# Patient Record
Sex: MALE | Race: WHITE | NOT HISPANIC OR LATINO | URBAN - METROPOLITAN AREA
[De-identification: names, ages, dates, MRNs, and addresses within clinical notes are randomized per-mention and may not be internally consistent; named-entity substitution may affect disease eponyms.]

---

## 2018-01-16 ENCOUNTER — OUTPATIENT (OUTPATIENT)
Dept: OUTPATIENT SERVICES | Facility: HOSPITAL | Age: 68
LOS: 1 days | End: 2018-01-16
Payer: COMMERCIAL

## 2018-01-16 VITALS
HEIGHT: 69 IN | DIASTOLIC BLOOD PRESSURE: 68 MMHG | WEIGHT: 205.91 LBS | TEMPERATURE: 99 F | OXYGEN SATURATION: 98 % | RESPIRATION RATE: 18 BRPM | SYSTOLIC BLOOD PRESSURE: 122 MMHG | HEART RATE: 73 BPM

## 2018-01-16 DIAGNOSIS — M17.11 UNILATERAL PRIMARY OSTEOARTHRITIS, RIGHT KNEE: ICD-10-CM

## 2018-01-16 DIAGNOSIS — Z98.890 OTHER SPECIFIED POSTPROCEDURAL STATES: Chronic | ICD-10-CM

## 2018-01-16 DIAGNOSIS — Z01.818 ENCOUNTER FOR OTHER PREPROCEDURAL EXAMINATION: ICD-10-CM

## 2018-01-16 DIAGNOSIS — Z96.652 PRESENCE OF LEFT ARTIFICIAL KNEE JOINT: Chronic | ICD-10-CM

## 2018-01-16 LAB
BLD GP AB SCN SERPL QL: SIGNIFICANT CHANGE UP
HBA1C BLD-MCNC: 5.3 % — SIGNIFICANT CHANGE UP (ref 4–5.6)

## 2018-01-16 PROCEDURE — G0463: CPT

## 2018-01-16 PROCEDURE — 83036 HEMOGLOBIN GLYCOSYLATED A1C: CPT

## 2018-01-16 PROCEDURE — 86900 BLOOD TYPING SEROLOGIC ABO: CPT

## 2018-01-16 PROCEDURE — 86901 BLOOD TYPING SEROLOGIC RH(D): CPT

## 2018-01-16 PROCEDURE — 87640 STAPH A DNA AMP PROBE: CPT

## 2018-01-16 PROCEDURE — 87641 MR-STAPH DNA AMP PROBE: CPT

## 2018-01-16 PROCEDURE — 86850 RBC ANTIBODY SCREEN: CPT

## 2018-01-16 NOTE — H&P PST ADULT - PMH
Arthritis    GERD (gastroesophageal reflux disease)    HLD (hyperlipidemia)    PE (pulmonary embolism)  in 1998, off coumadin now Arthritis    GERD (gastroesophageal reflux disease)    HLD (hyperlipidemia)    Osteoarthritis of right knee    PE (pulmonary embolism)  in 1998, off coumadin now Arthritis    GERD (gastroesophageal reflux disease)    Hemorrhoids    HLD (hyperlipidemia)    Osteoarthritis of right knee    PE (pulmonary embolism)  in 1998, off coumadin now  Seasonal allergies

## 2018-01-16 NOTE — H&P PST ADULT - ASSESSMENT
67 yr old male with PMH of Hyperlipidemia, hiatal hernia, OA presents with c/o right knee pain due to severe arthritis. Pt reports worsening of pain with ambulation. Pt for right total knee replacement on 1/24/18. 67 yr old male with PMH of Hyperlipidemia, hiatal hernia, PE in 1998, seasonal allergies, hemorrhoids, OA presents with right knee osteoarthritis.  Pt for right total knee replacement on 1/24/18. Pt is at moderate risk for procedure.

## 2018-01-16 NOTE — H&P PST ADULT - PROBLEM SELECTOR PLAN 1
right total knee replacement on 1/24/18. right total knee replacement on 1/24/18. Hold NSAIDS 1 week before surgery. Take Tylenol prn for pain.

## 2018-01-16 NOTE — H&P PST ADULT - FAMILY HISTORY
Mother  Still living? No  Family history of dementia, Age at diagnosis: Age Unknown     Father  Still living? No  Family history of cerebral hemorrhage, Age at diagnosis: Age Unknown     Grandparent  Still living? No  Family history of heart disease, Age at diagnosis: Age Unknown

## 2018-01-16 NOTE — H&P PST ADULT - NEGATIVE ALLERGY TYPES
no reactions to animals/no reactions to insect bites/no indoor environmental allergies/no reactions to medicines/no reactions to food

## 2018-01-16 NOTE — H&P PST ADULT - ATTENDING COMMENTS
Right knee traumatic arthritis, planned total knee replacement today after failed nonsurgical management for many years.  We discussed again the surgery, goals, recovery, risks, benefits and alternatives and he states he wants to proceed. All of his questions were answered.

## 2018-01-16 NOTE — H&P PST ADULT - NEGATIVE CARDIOVASCULAR SYMPTOMS
no palpitations/no dyspnea on exertion/no paroxysmal nocturnal dyspnea/no orthopnea/no peripheral edema/no claudication/no chest pain

## 2018-01-16 NOTE — H&P PST ADULT - NEGATIVE GASTROINTESTINAL SYMPTOMS
no vomiting/no constipation/no change in bowel habits/no flatulence/no abdominal pain/no diarrhea/no nausea

## 2018-01-16 NOTE — H&P PST ADULT - HISTORY OF PRESENT ILLNESS
67 yr old male with PMH of Hyperlipidemia, hiatal hernia, OA presents with c/o right knee pain due to severe arthritis. Pt reports worsening of pain with ambulation. Pt for right total knee replacement on 67 yr old male with PMH of Hyperlipidemia, hiatal hernia, OA presents with c/o right knee pain due to severe arthritis. Pt reports worsening of pain with ambulation. Pt for right total knee replacement on 1/24/18. 67 yr old male with PMH of Hyperlipidemia, hiatal hernia, PE in 1998, seasonal allergies, hemorrhoids, OA presents with c/o right knee pain due to severe arthritis. Pt reports worsening of pain with ambulation. Pt for right total knee replacement on 1/24/18.

## 2018-01-16 NOTE — H&P PST ADULT - RS GEN PE MLT RESP DETAILS PC
respirations non-labored/clear to auscultation bilaterally/normal/good air movement/breath sounds equal/airway patent no rhonchi/no wheezes/respirations non-labored/clear to auscultation bilaterally/normal/airway patent/no intercostal retractions/no rales/no subcutaneous emphysema/good air movement/breath sounds equal

## 2018-01-16 NOTE — H&P PST ADULT - NSANTHOSAYNRD_GEN_A_CORE
No. SARA screening performed.  STOP BANG Legend: 0-2 = LOW Risk; 3-4 = INTERMEDIATE Risk; 5-8 = HIGH Risk

## 2018-01-16 NOTE — H&P PST ADULT - PSH
History of bilateral carpal tunnel release    S/P knee replacement, left H/O ventral hernia repair    History of bilateral carpal tunnel release    S/P knee replacement, left

## 2018-01-17 LAB
MRSA PCR RESULT.: SIGNIFICANT CHANGE UP
S AUREUS DNA NOSE QL NAA+PROBE: SIGNIFICANT CHANGE UP

## 2018-01-19 ENCOUNTER — TRANSCRIPTION ENCOUNTER (OUTPATIENT)
Age: 68
End: 2018-01-19

## 2018-01-23 DIAGNOSIS — Z98.890 OTHER SPECIFIED POSTPROCEDURAL STATES: Chronic | ICD-10-CM

## 2018-01-24 ENCOUNTER — INPATIENT (INPATIENT)
Facility: HOSPITAL | Age: 68
LOS: 2 days | Discharge: NURSING, REHAB OR LONG-TERM CARE | DRG: 470 | End: 2018-01-27
Attending: ORTHOPAEDIC SURGERY | Admitting: ORTHOPAEDIC SURGERY
Payer: COMMERCIAL

## 2018-01-24 ENCOUNTER — TRANSCRIPTION ENCOUNTER (OUTPATIENT)
Age: 68
End: 2018-01-24

## 2018-01-24 VITALS
RESPIRATION RATE: 18 BRPM | HEART RATE: 61 BPM | SYSTOLIC BLOOD PRESSURE: 116 MMHG | DIASTOLIC BLOOD PRESSURE: 67 MMHG | OXYGEN SATURATION: 95 % | HEIGHT: 69 IN | TEMPERATURE: 98 F | WEIGHT: 205.91 LBS

## 2018-01-24 DIAGNOSIS — Z98.890 OTHER SPECIFIED POSTPROCEDURAL STATES: Chronic | ICD-10-CM

## 2018-01-24 DIAGNOSIS — Z96.652 PRESENCE OF LEFT ARTIFICIAL KNEE JOINT: Chronic | ICD-10-CM

## 2018-01-24 DIAGNOSIS — M17.11 UNILATERAL PRIMARY OSTEOARTHRITIS, RIGHT KNEE: ICD-10-CM

## 2018-01-24 LAB
ANION GAP SERPL CALC-SCNC: 4 MMOL/L — LOW (ref 5–17)
BLD GP AB SCN SERPL QL: SIGNIFICANT CHANGE UP
BUN SERPL-MCNC: 20 MG/DL — HIGH (ref 7–18)
CALCIUM SERPL-MCNC: 8.7 MG/DL — SIGNIFICANT CHANGE UP (ref 8.4–10.5)
CHLORIDE SERPL-SCNC: 107 MMOL/L — SIGNIFICANT CHANGE UP (ref 96–108)
CO2 SERPL-SCNC: 30 MMOL/L — SIGNIFICANT CHANGE UP (ref 22–31)
CREAT SERPL-MCNC: 0.79 MG/DL — SIGNIFICANT CHANGE UP (ref 0.5–1.3)
GLUCOSE SERPL-MCNC: 87 MG/DL — SIGNIFICANT CHANGE UP (ref 70–99)
HCT VFR BLD CALC: 43.8 % — SIGNIFICANT CHANGE UP (ref 39–50)
HGB BLD-MCNC: 13.9 G/DL — SIGNIFICANT CHANGE UP (ref 13–17)
MCHC RBC-ENTMCNC: 31.1 PG — SIGNIFICANT CHANGE UP (ref 27–34)
MCHC RBC-ENTMCNC: 31.8 GM/DL — LOW (ref 32–36)
MCV RBC AUTO: 97.8 FL — SIGNIFICANT CHANGE UP (ref 80–100)
PLATELET # BLD AUTO: 95 K/UL — LOW (ref 150–400)
POTASSIUM SERPL-MCNC: 4.3 MMOL/L — SIGNIFICANT CHANGE UP (ref 3.5–5.3)
POTASSIUM SERPL-SCNC: 4.3 MMOL/L — SIGNIFICANT CHANGE UP (ref 3.5–5.3)
RBC # BLD: 4.48 M/UL — SIGNIFICANT CHANGE UP (ref 4.2–5.8)
RBC # FLD: 14.1 % — SIGNIFICANT CHANGE UP (ref 10.3–14.5)
SODIUM SERPL-SCNC: 141 MMOL/L — SIGNIFICANT CHANGE UP (ref 135–145)
WBC # BLD: 3.8 K/UL — SIGNIFICANT CHANGE UP (ref 3.8–10.5)
WBC # FLD AUTO: 3.8 K/UL — SIGNIFICANT CHANGE UP (ref 3.8–10.5)

## 2018-01-24 PROCEDURE — 73562 X-RAY EXAM OF KNEE 3: CPT | Mod: 26,RT

## 2018-01-24 RX ORDER — CELECOXIB 200 MG/1
200 CAPSULE ORAL DAILY
Qty: 0 | Refills: 0 | Status: DISCONTINUED | OUTPATIENT
Start: 2018-01-25 | End: 2018-01-25

## 2018-01-24 RX ORDER — PHENYLEPHRINE HCL 0.25 %
1 SUPPOSITORY, RECTAL RECTAL
Qty: 0 | Refills: 0 | COMMUNITY

## 2018-01-24 RX ORDER — CELECOXIB 200 MG/1
200 CAPSULE ORAL ONCE
Qty: 0 | Refills: 0 | Status: COMPLETED | OUTPATIENT
Start: 2018-01-24 | End: 2018-01-24

## 2018-01-24 RX ORDER — TRANEXAMIC ACID 100 MG/ML
INJECTION, SOLUTION INTRAVENOUS
Qty: 0 | Refills: 0 | Status: DISCONTINUED | OUTPATIENT
Start: 2018-01-24 | End: 2018-01-24

## 2018-01-24 RX ORDER — ONDANSETRON 8 MG/1
4 TABLET, FILM COATED ORAL EVERY 6 HOURS
Qty: 0 | Refills: 0 | Status: DISCONTINUED | OUTPATIENT
Start: 2018-01-24 | End: 2018-01-27

## 2018-01-24 RX ORDER — FLUTICASONE PROPIONATE 50 MCG
1 SPRAY, SUSPENSION NASAL DAILY
Qty: 0 | Refills: 0 | Status: DISCONTINUED | OUTPATIENT
Start: 2018-01-24 | End: 2018-01-27

## 2018-01-24 RX ORDER — TRAMADOL HYDROCHLORIDE 50 MG/1
50 TABLET ORAL EVERY 8 HOURS
Qty: 0 | Refills: 0 | Status: DISCONTINUED | OUTPATIENT
Start: 2018-01-24 | End: 2018-01-27

## 2018-01-24 RX ORDER — SODIUM CHLORIDE 9 MG/ML
1000 INJECTION, SOLUTION INTRAVENOUS
Qty: 0 | Refills: 0 | Status: DISCONTINUED | OUTPATIENT
Start: 2018-01-24 | End: 2018-01-27

## 2018-01-24 RX ORDER — OXYCODONE HYDROCHLORIDE 5 MG/1
5 TABLET ORAL EVERY 4 HOURS
Qty: 0 | Refills: 0 | Status: DISCONTINUED | OUTPATIENT
Start: 2018-01-24 | End: 2018-01-27

## 2018-01-24 RX ORDER — HYDROMORPHONE HYDROCHLORIDE 2 MG/ML
0.5 INJECTION INTRAMUSCULAR; INTRAVENOUS; SUBCUTANEOUS EVERY 4 HOURS
Qty: 0 | Refills: 0 | Status: DISCONTINUED | OUTPATIENT
Start: 2018-01-24 | End: 2018-01-27

## 2018-01-24 RX ORDER — BUPIVACAINE 13.3 MG/ML
20 INJECTION, SUSPENSION, LIPOSOMAL INFILTRATION ONCE
Qty: 0 | Refills: 0 | Status: DISCONTINUED | OUTPATIENT
Start: 2018-01-24 | End: 2018-01-24

## 2018-01-24 RX ORDER — ASCORBIC ACID 60 MG
500 TABLET,CHEWABLE ORAL
Qty: 0 | Refills: 0 | Status: DISCONTINUED | OUTPATIENT
Start: 2018-01-24 | End: 2018-01-27

## 2018-01-24 RX ORDER — SENNA PLUS 8.6 MG/1
2 TABLET ORAL AT BEDTIME
Qty: 0 | Refills: 0 | Status: DISCONTINUED | OUTPATIENT
Start: 2018-01-24 | End: 2018-01-27

## 2018-01-24 RX ORDER — ENOXAPARIN SODIUM 100 MG/ML
30 INJECTION SUBCUTANEOUS EVERY 12 HOURS
Qty: 0 | Refills: 0 | Status: DISCONTINUED | OUTPATIENT
Start: 2018-01-25 | End: 2018-01-27

## 2018-01-24 RX ORDER — ACETAMINOPHEN 500 MG
650 TABLET ORAL EVERY 6 HOURS
Qty: 0 | Refills: 0 | Status: DISCONTINUED | OUTPATIENT
Start: 2018-01-24 | End: 2018-01-27

## 2018-01-24 RX ORDER — SIMVASTATIN 20 MG/1
20 TABLET, FILM COATED ORAL AT BEDTIME
Qty: 0 | Refills: 0 | Status: DISCONTINUED | OUTPATIENT
Start: 2018-01-24 | End: 2018-01-27

## 2018-01-24 RX ORDER — DOCUSATE SODIUM 100 MG
100 CAPSULE ORAL THREE TIMES A DAY
Qty: 0 | Refills: 0 | Status: DISCONTINUED | OUTPATIENT
Start: 2018-01-24 | End: 2018-01-27

## 2018-01-24 RX ORDER — SODIUM CHLORIDE 9 MG/ML
3 INJECTION INTRAMUSCULAR; INTRAVENOUS; SUBCUTANEOUS EVERY 8 HOURS
Qty: 0 | Refills: 0 | Status: DISCONTINUED | OUTPATIENT
Start: 2018-01-24 | End: 2018-01-27

## 2018-01-24 RX ORDER — CEFAZOLIN SODIUM 1 G
1000 VIAL (EA) INJECTION EVERY 8 HOURS
Qty: 0 | Refills: 0 | Status: COMPLETED | OUTPATIENT
Start: 2018-01-24 | End: 2018-01-25

## 2018-01-24 RX ORDER — FOLIC ACID 0.8 MG
1 TABLET ORAL DAILY
Qty: 0 | Refills: 0 | Status: DISCONTINUED | OUTPATIENT
Start: 2018-01-24 | End: 2018-01-27

## 2018-01-24 RX ORDER — GABAPENTIN 400 MG/1
100 CAPSULE ORAL ONCE
Qty: 0 | Refills: 0 | Status: COMPLETED | OUTPATIENT
Start: 2018-01-24 | End: 2018-01-24

## 2018-01-24 RX ORDER — LORATADINE 10 MG/1
10 TABLET ORAL DAILY
Qty: 0 | Refills: 0 | Status: DISCONTINUED | OUTPATIENT
Start: 2018-01-24 | End: 2018-01-27

## 2018-01-24 RX ORDER — PANTOPRAZOLE SODIUM 20 MG/1
40 TABLET, DELAYED RELEASE ORAL
Qty: 0 | Refills: 0 | Status: DISCONTINUED | OUTPATIENT
Start: 2018-01-24 | End: 2018-01-25

## 2018-01-24 RX ORDER — HYDROMORPHONE HYDROCHLORIDE 2 MG/ML
0.5 INJECTION INTRAMUSCULAR; INTRAVENOUS; SUBCUTANEOUS
Qty: 0 | Refills: 0 | Status: DISCONTINUED | OUTPATIENT
Start: 2018-01-24 | End: 2018-01-24

## 2018-01-24 RX ORDER — TRANEXAMIC ACID 100 MG/ML
1000 INJECTION, SOLUTION INTRAVENOUS ONCE
Qty: 0 | Refills: 0 | Status: DISCONTINUED | OUTPATIENT
Start: 2018-01-24 | End: 2018-01-24

## 2018-01-24 RX ORDER — FAMOTIDINE 10 MG/ML
20 INJECTION INTRAVENOUS EVERY 12 HOURS
Qty: 0 | Refills: 0 | Status: DISCONTINUED | OUTPATIENT
Start: 2018-01-24 | End: 2018-01-27

## 2018-01-24 RX ORDER — GABAPENTIN 400 MG/1
100 CAPSULE ORAL THREE TIMES A DAY
Qty: 0 | Refills: 0 | Status: DISCONTINUED | OUTPATIENT
Start: 2018-01-24 | End: 2018-01-27

## 2018-01-24 RX ORDER — FERROUS SULFATE 325(65) MG
325 TABLET ORAL
Qty: 0 | Refills: 0 | Status: DISCONTINUED | OUTPATIENT
Start: 2018-01-24 | End: 2018-01-27

## 2018-01-24 RX ORDER — TRAMADOL HYDROCHLORIDE 50 MG/1
25 TABLET ORAL ONCE
Qty: 0 | Refills: 0 | Status: DISCONTINUED | OUTPATIENT
Start: 2018-01-24 | End: 2018-01-24

## 2018-01-24 RX ADMIN — HYDROMORPHONE HYDROCHLORIDE 0.5 MILLIGRAM(S): 2 INJECTION INTRAMUSCULAR; INTRAVENOUS; SUBCUTANEOUS at 21:09

## 2018-01-24 RX ADMIN — GABAPENTIN 100 MILLIGRAM(S): 400 CAPSULE ORAL at 10:13

## 2018-01-24 RX ADMIN — HYDROMORPHONE HYDROCHLORIDE 0.5 MILLIGRAM(S): 2 INJECTION INTRAMUSCULAR; INTRAVENOUS; SUBCUTANEOUS at 19:25

## 2018-01-24 RX ADMIN — Medication 100 MILLIGRAM(S): at 21:53

## 2018-01-24 RX ADMIN — CELECOXIB 200 MILLIGRAM(S): 200 CAPSULE ORAL at 10:14

## 2018-01-24 RX ADMIN — TRAMADOL HYDROCHLORIDE 50 MILLIGRAM(S): 50 TABLET ORAL at 22:03

## 2018-01-24 RX ADMIN — HYDROMORPHONE HYDROCHLORIDE 0.5 MILLIGRAM(S): 2 INJECTION INTRAMUSCULAR; INTRAVENOUS; SUBCUTANEOUS at 21:40

## 2018-01-24 RX ADMIN — SENNA PLUS 2 TABLET(S): 8.6 TABLET ORAL at 21:56

## 2018-01-24 RX ADMIN — SIMVASTATIN 20 MILLIGRAM(S): 20 TABLET, FILM COATED ORAL at 21:56

## 2018-01-24 RX ADMIN — Medication 100 MILLIGRAM(S): at 21:56

## 2018-01-24 RX ADMIN — Medication 500 MILLIGRAM(S): at 21:55

## 2018-01-24 RX ADMIN — GABAPENTIN 100 MILLIGRAM(S): 400 CAPSULE ORAL at 21:55

## 2018-01-24 RX ADMIN — SODIUM CHLORIDE 3 MILLILITER(S): 9 INJECTION INTRAMUSCULAR; INTRAVENOUS; SUBCUTANEOUS at 10:15

## 2018-01-24 RX ADMIN — HYDROMORPHONE HYDROCHLORIDE 0.5 MILLIGRAM(S): 2 INJECTION INTRAMUSCULAR; INTRAVENOUS; SUBCUTANEOUS at 18:55

## 2018-01-24 RX ADMIN — TRAMADOL HYDROCHLORIDE 25 MILLIGRAM(S): 50 TABLET ORAL at 10:13

## 2018-01-24 NOTE — PATIENT PROFILE ADULT. - PMH
Arthritis    GERD (gastroesophageal reflux disease)    Hemorrhoids    HLD (hyperlipidemia)    Osteoarthritis of right knee    PE (pulmonary embolism)  in 1998, off coumadin now  Seasonal allergies

## 2018-01-24 NOTE — PATIENT PROFILE ADULT. - PSH
H/O ventral hernia repair    History of bilateral carpal tunnel release    S/P knee replacement, left

## 2018-01-25 ENCOUNTER — TRANSCRIPTION ENCOUNTER (OUTPATIENT)
Age: 68
End: 2018-01-25

## 2018-01-25 DIAGNOSIS — M25.561 PAIN IN RIGHT KNEE: ICD-10-CM

## 2018-01-25 DIAGNOSIS — Z96.651 PRESENCE OF RIGHT ARTIFICIAL KNEE JOINT: ICD-10-CM

## 2018-01-25 DIAGNOSIS — R06.2 WHEEZING: ICD-10-CM

## 2018-01-25 LAB
ANION GAP SERPL CALC-SCNC: 3 MMOL/L — LOW (ref 5–17)
BUN SERPL-MCNC: 17 MG/DL — SIGNIFICANT CHANGE UP (ref 7–18)
CALCIUM SERPL-MCNC: 8.5 MG/DL — SIGNIFICANT CHANGE UP (ref 8.4–10.5)
CHLORIDE SERPL-SCNC: 103 MMOL/L — SIGNIFICANT CHANGE UP (ref 96–108)
CO2 SERPL-SCNC: 31 MMOL/L — SIGNIFICANT CHANGE UP (ref 22–31)
CREAT SERPL-MCNC: 0.7 MG/DL — SIGNIFICANT CHANGE UP (ref 0.5–1.3)
GLUCOSE SERPL-MCNC: 89 MG/DL — SIGNIFICANT CHANGE UP (ref 70–99)
HCT VFR BLD CALC: 39.8 % — SIGNIFICANT CHANGE UP (ref 39–50)
HGB BLD-MCNC: 12.8 G/DL — LOW (ref 13–17)
MCHC RBC-ENTMCNC: 31.4 PG — SIGNIFICANT CHANGE UP (ref 27–34)
MCHC RBC-ENTMCNC: 32.1 GM/DL — SIGNIFICANT CHANGE UP (ref 32–36)
MCV RBC AUTO: 97.8 FL — SIGNIFICANT CHANGE UP (ref 80–100)
PLATELET # BLD AUTO: 89 K/UL — LOW (ref 150–400)
POTASSIUM SERPL-MCNC: 3.9 MMOL/L — SIGNIFICANT CHANGE UP (ref 3.5–5.3)
POTASSIUM SERPL-SCNC: 3.9 MMOL/L — SIGNIFICANT CHANGE UP (ref 3.5–5.3)
RBC # BLD: 4.08 M/UL — LOW (ref 4.2–5.8)
RBC # FLD: 14 % — SIGNIFICANT CHANGE UP (ref 10.3–14.5)
SODIUM SERPL-SCNC: 137 MMOL/L — SIGNIFICANT CHANGE UP (ref 135–145)
WBC # BLD: 5.5 K/UL — SIGNIFICANT CHANGE UP (ref 3.8–10.5)
WBC # FLD AUTO: 5.5 K/UL — SIGNIFICANT CHANGE UP (ref 3.8–10.5)

## 2018-01-25 RX ORDER — ENOXAPARIN SODIUM 100 MG/ML
40 INJECTION SUBCUTANEOUS
Qty: 12 | Refills: 0
Start: 2018-01-25 | End: 2018-02-05

## 2018-01-25 RX ORDER — GABAPENTIN 400 MG/1
3 CAPSULE ORAL
Qty: 270 | Refills: 0 | OUTPATIENT
Start: 2018-01-25 | End: 2018-02-23

## 2018-01-25 RX ORDER — DOCUSATE SODIUM 100 MG
1 CAPSULE ORAL
Qty: 30 | Refills: 0
Start: 2018-01-25

## 2018-01-25 RX ORDER — ACETAMINOPHEN 500 MG
2 TABLET ORAL
Qty: 0 | Refills: 0 | COMMUNITY

## 2018-01-25 RX ORDER — BENZOCAINE AND MENTHOL 5; 1 G/100ML; G/100ML
1 LIQUID ORAL ONCE
Qty: 0 | Refills: 0 | Status: COMPLETED | OUTPATIENT
Start: 2018-01-25 | End: 2018-01-25

## 2018-01-25 RX ORDER — FOLIC ACID 0.8 MG
1 TABLET ORAL
Qty: 30 | Refills: 0
Start: 2018-01-25

## 2018-01-25 RX ORDER — TRAMADOL HYDROCHLORIDE 50 MG/1
1 TABLET ORAL
Qty: 30 | Refills: 0
Start: 2018-01-25

## 2018-01-25 RX ORDER — ZOLPIDEM TARTRATE 10 MG/1
5 TABLET ORAL AT BEDTIME
Qty: 0 | Refills: 0 | Status: DISCONTINUED | OUTPATIENT
Start: 2018-01-25 | End: 2018-01-25

## 2018-01-25 RX ORDER — ASCORBIC ACID 60 MG
1 TABLET,CHEWABLE ORAL
Qty: 30 | Refills: 0
Start: 2018-01-25

## 2018-01-25 RX ORDER — CELECOXIB 200 MG/1
1 CAPSULE ORAL
Qty: 30 | Refills: 0
Start: 2018-01-25 | End: 2018-02-23

## 2018-01-25 RX ORDER — BENZOCAINE AND MENTHOL 5; 1 G/100ML; G/100ML
1 LIQUID ORAL EVERY 4 HOURS
Qty: 0 | Refills: 0 | Status: DISCONTINUED | OUTPATIENT
Start: 2018-01-25 | End: 2018-01-27

## 2018-01-25 RX ADMIN — GABAPENTIN 100 MILLIGRAM(S): 400 CAPSULE ORAL at 15:16

## 2018-01-25 RX ADMIN — GABAPENTIN 100 MILLIGRAM(S): 400 CAPSULE ORAL at 05:18

## 2018-01-25 RX ADMIN — OXYCODONE HYDROCHLORIDE 5 MILLIGRAM(S): 5 TABLET ORAL at 10:05

## 2018-01-25 RX ADMIN — Medication 500 MILLIGRAM(S): at 05:18

## 2018-01-25 RX ADMIN — BENZOCAINE AND MENTHOL 1 LOZENGE: 5; 1 LIQUID ORAL at 06:11

## 2018-01-25 RX ADMIN — ZOLPIDEM TARTRATE 5 MILLIGRAM(S): 10 TABLET ORAL at 21:29

## 2018-01-25 RX ADMIN — Medication 325 MILLIGRAM(S): at 18:26

## 2018-01-25 RX ADMIN — GABAPENTIN 100 MILLIGRAM(S): 400 CAPSULE ORAL at 21:29

## 2018-01-25 RX ADMIN — OXYCODONE HYDROCHLORIDE 5 MILLIGRAM(S): 5 TABLET ORAL at 06:30

## 2018-01-25 RX ADMIN — SENNA PLUS 2 TABLET(S): 8.6 TABLET ORAL at 21:29

## 2018-01-25 RX ADMIN — TRAMADOL HYDROCHLORIDE 50 MILLIGRAM(S): 50 TABLET ORAL at 22:30

## 2018-01-25 RX ADMIN — SODIUM CHLORIDE 3 MILLILITER(S): 9 INJECTION INTRAMUSCULAR; INTRAVENOUS; SUBCUTANEOUS at 21:33

## 2018-01-25 RX ADMIN — TRAMADOL HYDROCHLORIDE 50 MILLIGRAM(S): 50 TABLET ORAL at 05:19

## 2018-01-25 RX ADMIN — SIMVASTATIN 20 MILLIGRAM(S): 20 TABLET, FILM COATED ORAL at 21:29

## 2018-01-25 RX ADMIN — HYDROMORPHONE HYDROCHLORIDE 0.5 MILLIGRAM(S): 2 INJECTION INTRAMUSCULAR; INTRAVENOUS; SUBCUTANEOUS at 03:25

## 2018-01-25 RX ADMIN — ENOXAPARIN SODIUM 30 MILLIGRAM(S): 100 INJECTION SUBCUTANEOUS at 05:20

## 2018-01-25 RX ADMIN — FAMOTIDINE 20 MILLIGRAM(S): 10 INJECTION INTRAVENOUS at 18:26

## 2018-01-25 RX ADMIN — Medication 500 MILLIGRAM(S): at 18:26

## 2018-01-25 RX ADMIN — FAMOTIDINE 20 MILLIGRAM(S): 10 INJECTION INTRAVENOUS at 05:18

## 2018-01-25 RX ADMIN — SODIUM CHLORIDE 3 MILLILITER(S): 9 INJECTION INTRAMUSCULAR; INTRAVENOUS; SUBCUTANEOUS at 05:28

## 2018-01-25 RX ADMIN — BENZOCAINE AND MENTHOL 1 LOZENGE: 5; 1 LIQUID ORAL at 21:29

## 2018-01-25 RX ADMIN — Medication 325 MILLIGRAM(S): at 13:01

## 2018-01-25 RX ADMIN — TRAMADOL HYDROCHLORIDE 50 MILLIGRAM(S): 50 TABLET ORAL at 21:29

## 2018-01-25 RX ADMIN — Medication 100 MILLIGRAM(S): at 05:18

## 2018-01-25 RX ADMIN — OXYCODONE HYDROCHLORIDE 5 MILLIGRAM(S): 5 TABLET ORAL at 10:35

## 2018-01-25 RX ADMIN — SODIUM CHLORIDE 3 MILLILITER(S): 9 INJECTION INTRAMUSCULAR; INTRAVENOUS; SUBCUTANEOUS at 03:24

## 2018-01-25 RX ADMIN — TRAMADOL HYDROCHLORIDE 50 MILLIGRAM(S): 50 TABLET ORAL at 13:30

## 2018-01-25 RX ADMIN — Medication 1 TABLET(S): at 13:01

## 2018-01-25 RX ADMIN — TRAMADOL HYDROCHLORIDE 50 MILLIGRAM(S): 50 TABLET ORAL at 05:20

## 2018-01-25 RX ADMIN — Medication 100 MILLIGRAM(S): at 13:01

## 2018-01-25 RX ADMIN — OXYCODONE HYDROCHLORIDE 5 MILLIGRAM(S): 5 TABLET ORAL at 06:03

## 2018-01-25 RX ADMIN — SODIUM CHLORIDE 3 MILLILITER(S): 9 INJECTION INTRAMUSCULAR; INTRAVENOUS; SUBCUTANEOUS at 14:55

## 2018-01-25 RX ADMIN — Medication 1 MILLIGRAM(S): at 13:01

## 2018-01-25 RX ADMIN — ENOXAPARIN SODIUM 30 MILLIGRAM(S): 100 INJECTION SUBCUTANEOUS at 18:26

## 2018-01-25 RX ADMIN — Medication 100 MILLIGRAM(S): at 21:29

## 2018-01-25 RX ADMIN — TRAMADOL HYDROCHLORIDE 50 MILLIGRAM(S): 50 TABLET ORAL at 13:03

## 2018-01-25 RX ADMIN — HYDROMORPHONE HYDROCHLORIDE 0.5 MILLIGRAM(S): 2 INJECTION INTRAMUSCULAR; INTRAVENOUS; SUBCUTANEOUS at 02:54

## 2018-01-25 NOTE — PHYSICAL THERAPY INITIAL EVALUATION ADULT - CRITERIA FOR SKILLED THERAPEUTIC INTERVENTIONS
rehab potential/anticipated equipment needs at discharge/predicted duration of therapy intervention/impairments found/therapy frequency/anticipated discharge recommendation

## 2018-01-25 NOTE — DISCHARGE NOTE ADULT - CARE PLAN
Principal Discharge DX:	Knee joint replacement status, right  Goal:	Increase mobility decrease pain  Assessment and plan of treatment:	wound assessment

## 2018-01-25 NOTE — PHYSICAL THERAPY INITIAL EVALUATION ADULT - ACTIVE RANGE OF MOTION EXAMINATION, REHAB EVAL
right ankle-WFL/Left LE Active ROM was WFL (within functional limits)/bilateral upper extremity Active ROM was WFL (within functional limits)

## 2018-01-25 NOTE — CONSULT NOTE ADULT - SUBJECTIVE AND OBJECTIVE BOX
HPI:  67 yr old male with PMH of Hyperlipidemia, hiatal hernia, PE in 1998, seasonal allergies, hemorrhoids, OA presents with c/o right knee pain due to severe arthritis. Pt reports worsening of pain with ambulation. Pt for right total knee replacement on 1/24/18. (16 Jan 2018 11:43)      1/25/18 - 67 year old male, s/p right knee replacement.  Pt complaining of right knee pain which worsens on exertion.  No nausea or vomiting.  No chest pain or sob.  Pt oob with PT.      PAIN SCORE:     5/10    SCALE USED: (1-10 VNRS)      PAST MEDICAL & SURGICAL HISTORY:  Hemorrhoids  Seasonal allergies  Osteoarthritis of right knee  PE (pulmonary embolism): in 1998, off coumadin now  Arthritis  GERD (gastroesophageal reflux disease)  HLD (hyperlipidemia)  H/O ventral hernia repair  History of bilateral carpal tunnel release  S/P knee replacement, left      FAMILY HISTORY:  Family history of heart disease (Grandparent)  Family history of cerebral hemorrhage (Father)  Family history of dementia (Mother)      SOCIAL HISTORY:  [x ] Denies Smoking, Alcohol, or Drug Use    Allergies    No Known Allergies    Intolerances        PAIN MEDICATIONS:  acetaminophen   Tablet 650 milliGRAM(s) Oral every 6 hours PRN  gabapentin 100 milliGRAM(s) Oral three times a day  HYDROmorphone  Injectable 0.5 milliGRAM(s) IV Push every 4 hours PRN  ondansetron Injectable 4 milliGRAM(s) IV Push every 6 hours PRN  oxyCODONE    IR 5 milliGRAM(s) Oral every 4 hours PRN  traMADol 50 milliGRAM(s) Oral every 8 hours  zolpidem 5 milliGRAM(s) Oral at bedtime PRN    Heme:  enoxaparin Injectable 30 milliGRAM(s) SubCutaneous every 12 hours    Antibiotics:    Cardiovascular:    GI:  aluminum hydroxide/magnesium hydroxide/simethicone Suspension 30 milliLiter(s) Oral four times a day PRN  docusate sodium 100 milliGRAM(s) Oral three times a day  famotidine    Tablet 20 milliGRAM(s) Oral every 12 hours  senna 2 Tablet(s) Oral at bedtime    Endocrine:  simvastatin 20 milliGRAM(s) Oral at bedtime    All Other Medications:  ascorbic acid 500 milliGRAM(s) Oral two times a day  dextrose 5% + sodium chloride 0.9%. 1000 milliLiter(s) IV Continuous <Continuous>  ferrous    sulfate 325 milliGRAM(s) Oral three times a day with meals  fluticasone propionate 50 MICROgram(s)/spray Nasal Spray 1 Spray(s) Both Nostrils daily PRN  folic acid 1 milliGRAM(s) Oral daily  multivitamin 1 Tablet(s) Oral daily  sodium chloride 0.9% lock flush 3 milliLiter(s) IV Push every 8 hours          Vital Signs Last 24 Hrs  T(C): 36.5 (25 Jan 2018 05:50), Max: 36.9 (24 Jan 2018 18:15)  T(F): 97.7 (25 Jan 2018 05:50), Max: 98.4 (24 Jan 2018 18:15)  HR: 70 (25 Jan 2018 05:50) (39 - 104)  BP: 100/55 (25 Jan 2018 05:50) (100/55 - 193/100)  BP(mean): 83 (24 Jan 2018 20:21) (75 - 125)  RR: 16 (25 Jan 2018 05:50) (11 - 21)  SpO2: 97% (25 Jan 2018 05:50) (95% - 100%)                       LABS:                          12.8   5.5   )-----------( 89       ( 25 Jan 2018 07:00 )             39.8     01-25    137  |  103  |  17  ----------------------------<  89  3.9   |  31  |  0.70    Ca    8.5      25 Jan 2018 07:00            RADIOLOGY:    Drug Screen:            [ ]  [place]  Reviewed and Copied to Chart

## 2018-01-25 NOTE — DISCHARGE NOTE ADULT - MEDICATION SUMMARY - MEDICATIONS TO STOP TAKING
I will STOP taking the medications listed below when I get home from the hospital:    Aleve sodium 220 mg oral tablet  -- 2 tab(s) by mouth once a day, As Needed    Mucinex 600 mg oral tablet, extended release  -- 1 tab(s) by mouth every 12 hours, As Needed    Tylenol 8 HR Arthritis Pain 650 mg oral tablet, extended release  -- 2 tab(s) by mouth every 8 hours, As Needed

## 2018-01-25 NOTE — PROGRESS NOTE ADULT - ASSESSMENT
Right TKA POD 1  doing well    1.  start PT, WBAT.    2.  DVT PPX with Lovenox  3.  ADAT  4. Pain control  5.  DC planning for tomorrow to home with services.   FU office 3 weeks [phone]

## 2018-01-25 NOTE — PROGRESS NOTE ADULT - SUBJECTIVE AND OBJECTIVE BOX
Attending note:   Patient seen and examined on rounds.  POD #1 right TKA  Denies chest pain or SOB.  Pain is controlled.      EXAM;  Right knee dressings CDI  Calf pumps in place  EHL TA GS 5/5  SILT  Calves supple bilaterally  Abdomen soft and NT

## 2018-01-25 NOTE — DISCHARGE NOTE ADULT - CARE PROVIDER_API CALL
Lajam, Claudette M (MD), Orthopaedic Surgery  [address] [place] [address] [address] [place] [id number]  Phone: [phone]  Fax: [phone]

## 2018-01-25 NOTE — CONSULT NOTE ADULT - PROBLEM SELECTOR RECOMMENDATION 9
pain control  ortho follow up  incentive spirometry  DVT PPX  PT/Rehab
- platelets <100  - dc celebrex  - continue tramadol 50mg po q 8  - oxycodone 5mg po prn  - will dc hydromorphone today  - stool softeners  - oob

## 2018-01-25 NOTE — DISCHARGE NOTE ADULT - ADDITIONAL INSTRUCTIONS
Pain management  DVT PPx  WBAT with walker  D/c Lovenox on 2/8/18  D/c Staples on 2/8/18  F/u with Dr Amaro

## 2018-01-25 NOTE — DISCHARGE NOTE ADULT - MEDICATION SUMMARY - MEDICATIONS TO TAKE
I will START or STAY ON the medications listed below when I get home from the hospital:    spirulina- SEAWEED  -- 1 cap(s) by mouth once a day  -- Indication: For Knee joint replacement status, right    tumeric  -- 1 cap(s) by mouth once a day  -- Indication: For Knee joint replacement status, right    ginseng complex  -- 1 cap(s) by mouth once a day  -- Indication: For Knee joint replacement status, right    ginsenna  -- 1 cap(s) by mouth once a day  -- Indication: For Knee joint replacement status, right    traMADol 50 mg oral tablet  -- 1 tab(s) by mouth every 6 hours, As Needed MDD:4  -- Indication: For Knee joint replacement status, right    CeleBREX 200 mg oral capsule  -- 1 cap(s) by mouth once a day   -- Do not take this drug if you are pregnant.  Medication should be taken with plenty of water.  Obtain medical advice before taking any non-prescription drugs as some may affect the action of this medication.  Take with food or milk.    -- Indication: For Knee joint replacement status, right    Lovenox 40 mg/0.4 mL injectable solution  -- 40 milligram(s) subcutaneously once a day   -- It is very important that you take or use this exactly as directed.  Do not skip doses or discontinue unless directed by your doctor.    -- Indication: For Knee joint replacement status, right    gabapentin 100 mg oral capsule  -- 3 cap(s) by mouth 3 times a day   -- It is very important that you take or use this exactly as directed.  Do not skip doses or discontinue unless directed by your doctor.  May cause drowsiness.  Alcohol may intensify this effect.  Use care when operating dangerous machinery.    -- Indication: For Knee joint replacement status, right    Claritin 10 mg oral tablet  -- 1 tab(s) by mouth once a day, As Needed  -- Indication: For Knee joint replacement status, right    simvastatin 20 mg oral tablet  -- 1 tab(s) by mouth once a day (at bedtime)  -- Indication: For Knee joint replacement status, right    Saw Palmetto oral capsule  -- 1 cap(s) by mouth once a day  -- Indication: For Knee joint replacement status, right    Ginger Root oral capsule  -- 1 cap(s) by mouth once a day  -- Indication: For Knee joint replacement status, right    Cinnamon 500 mg oral capsule  -- 2 cap(s) by mouth 2 times a day  -- Indication: For Knee joint replacement status, right    docusate sodium 100 mg oral capsule  -- 1 cap(s) by mouth 3 times a day  -- Indication: For Knee joint replacement status, right    selenium 50 mcg oral tablet  -- 1 tab(s) by mouth once a day  -- Indication: For Knee joint replacement status, right    fluticasone 50 mcg/inh nasal spray  -- 1 spray(s) into nose once a day, As Needed  -- Indication: For Knee joint replacement status, right    CoQ10 300 mg oral capsule  -- 1 cap(s) by mouth once a day  -- Indication: For Knee joint replacement status, right    Probiotic Formula oral capsule  -- 1 cap(s) by mouth once a day  -- Indication: For Knee joint replacement status, right    Dexilant 60 mg oral delayed release capsule  -- 1 cap(s) by mouth once a day  -- Indication: For Knee joint replacement status, right    testosterone  --  intramuscular every 4 weeks  -- Indication: For Knee joint replacement status, right    Multiple Vitamins oral tablet  -- 1 tab(s) by mouth once a day  -- Indication: For Knee joint replacement status, right    Vitamin B-100 oral tablet  -- 1 tab(s) by mouth once a day  -- Indication: For Knee joint replacement status, right    Vitamin B6 100 mg oral tablet  -- 1 tab(s) by mouth once a day  -- Indication: For Knee joint replacement status, right    Vitamin B12 100 mcg oral tablet  -- 1 tab(s) by mouth once a day  -- Indication: For Knee joint replacement status, right    Vitamin D3 1000 intl units oral tablet  -- 1 tab(s) by mouth once a day  -- Indication: For Knee joint replacement status, right    ascorbic acid 500 mg oral tablet  -- 1 tab(s) by mouth 2 times a day  -- Indication: For Knee joint replacement status, right    folic acid 1 mg oral tablet  -- 1 tab(s) by mouth once a day  -- Indication: For Knee joint replacement status, right I will START or STAY ON the medications listed below when I get home from the hospital:    spirulina- SEAWEED  -- 1 cap(s) by mouth once a day  -- Indication: For Knee joint replacement status, right    tumeric  -- 1 cap(s) by mouth once a day  -- Indication: For Knee joint replacement status, right    ginseng complex  -- 1 cap(s) by mouth once a day  -- Indication: For Knee joint replacement status, right    ginsenna  -- 1 cap(s) by mouth once a day  -- Indication: For Knee joint replacement status, right    traMADol 50 mg oral tablet  -- 1 tab(s) by mouth every 6 hours, As Needed MDD:4  -- Indication: For Knee joint replacement status, right    CeleBREX 200 mg oral capsule  -- 1 cap(s) by mouth once a day   -- Do not take this drug if you are pregnant.  Medication should be taken with plenty of water.  Obtain medical advice before taking any non-prescription drugs as some may affect the action of this medication.  Take with food or milk.    -- Indication: For Knee joint replacement status, right    Lovenox 40 mg/0.4 mL injectable solution  -- 40 milligram(s) subcutaneously once a day   -- It is very important that you take or use this exactly as directed.  Do not skip doses or discontinue unless directed by your doctor.    -- Indication: For Knee joint replacement status, right    gabapentin 100 mg oral capsule  -- 1 cap(s) by mouth once a day MDD:1  -- It is very important that you take or use this exactly as directed.  Do not skip doses or discontinue unless directed by your doctor.  May cause drowsiness.  Alcohol may intensify this effect.  Use care when operating dangerous machinery.    -- Indication: For Pain    Claritin 10 mg oral tablet  -- 1 tab(s) by mouth once a day, As Needed  -- Indication: For Knee joint replacement status, right    simvastatin 20 mg oral tablet  -- 1 tab(s) by mouth once a day (at bedtime)  -- Indication: For Knee joint replacement status, right    doxycycline hyclate 100 mg oral capsule  -- 1 cap(s) by mouth once a day MDD:1  -- Avoid prolonged or excessive exposure to direct and/or artificial sunlight while taking this medication.  Do not take this drug if you are pregnant.  Finish all this medication unless otherwise directed by prescriber.  Medication should be taken with plenty of water.    -- Indication: For Knee erythema    Saw Palmetto oral capsule  -- 1 cap(s) by mouth once a day  -- Indication: For Knee joint replacement status, right    Ginger Root oral capsule  -- 1 cap(s) by mouth once a day  -- Indication: For Knee joint replacement status, right    Cinnamon 500 mg oral capsule  -- 2 cap(s) by mouth 2 times a day  -- Indication: For Knee joint replacement status, right    docusate sodium 100 mg oral capsule  -- 1 cap(s) by mouth 3 times a day  -- Indication: For Knee joint replacement status, right    Senna 8.6 mg oral tablet  -- 2 tab(s) by mouth once a day (at bedtime) MDD:2   -- Indication: For constipation    selenium 50 mcg oral tablet  -- 1 tab(s) by mouth once a day  -- Indication: For Knee joint replacement status, right    fluticasone 50 mcg/inh nasal spray  -- 1 spray(s) into nose once a day, As Needed  -- Indication: For Knee joint replacement status, right    CoQ10 300 mg oral capsule  -- 1 cap(s) by mouth once a day  -- Indication: For Knee joint replacement status, right    Probiotic Formula oral capsule  -- 1 cap(s) by mouth once a day  -- Indication: For Knee joint replacement status, right    Dexilant 60 mg oral delayed release capsule  -- 1 cap(s) by mouth once a day  -- Indication: For Knee joint replacement status, right    testosterone  --  intramuscular every 4 weeks  -- Indication: For Knee joint replacement status, right    Multiple Vitamins oral tablet  -- 1 tab(s) by mouth once a day  -- Indication: For Knee joint replacement status, right    Vitamin B-100 oral tablet  -- 1 tab(s) by mouth once a day  -- Indication: For Knee joint replacement status, right    Vitamin B6 100 mg oral tablet  -- 1 tab(s) by mouth once a day  -- Indication: For Knee joint replacement status, right    Vitamin B12 100 mcg oral tablet  -- 1 tab(s) by mouth once a day  -- Indication: For Knee joint replacement status, right    Vitamin D3 1000 intl units oral tablet  -- 1 tab(s) by mouth once a day  -- Indication: For Knee joint replacement status, right    ascorbic acid 500 mg oral tablet  -- 1 tab(s) by mouth 2 times a day  -- Indication: For Knee joint replacement status, right    folic acid 1 mg oral tablet  -- 1 tab(s) by mouth once a day  -- Indication: For Knee joint replacement status, right

## 2018-01-25 NOTE — DISCHARGE NOTE ADULT - PATIENT PORTAL LINK FT
“You can access the [facility] Patient Portal, offered by [facility], by registering with the following website: [email or web address]”

## 2018-01-25 NOTE — PROGRESS NOTE ADULT - SUBJECTIVE AND OBJECTIVE BOX
67yMale    Diagnosis:  S/p RIGHT Total Knee Replacement POD#1    Patient was seen and evaluated at bedside. Patient with no acute complaints.   Pain is  well controlled.  Awaiting PT for ambulation.   Denies CP/SOB, dyspnea, paresthesias, N/V/D, palpitations.     Vital Signs Last 24 Hrs  T(C): 36.5 (25 Jan 2018 05:50), Max: 36.9 (24 Jan 2018 18:15)  T(F): 97.7 (25 Jan 2018 05:50), Max: 98.4 (24 Jan 2018 18:15)  HR: 70 (25 Jan 2018 05:50) (39 - 104)  BP: 100/55 (25 Jan 2018 05:50) (100/55 - 193/100)  BP(mean): 83 (24 Jan 2018 20:21) (75 - 125)  RR: 16 (25 Jan 2018 05:50) (11 - 21)  SpO2: 97% (25 Jan 2018 05:50) (95% - 100%)  I&O's Detail    24 Jan 2018 07:01  -  25 Jan 2018 07:00  --------------------------------------------------------  IN:    Lactated Ringers IV Bolus: 1300 mL  Total IN: 1300 mL    OUT:    Estimated Blood Loss: 150 mL  Total OUT: 150 mL    Total NET: 1150 mL          Physical Exam:    General: AAOx3, NAD, resting comfortably in bed.    Right knee:  Dressing is C/D/I. Skin is pink and warm. SILT.  No drainage.   Lower extremities:  No calf tenderness, calves are soft. 2+pulses. NVI. 5/5 Strength of EHL/TA/gastrocnemius B/L.  Good capillary refill. SILT.                          12.8   5.5   )-----------( 89       ( 25 Jan 2018 07:00 )             39.8     01-25    137  |  103  |  17  ----------------------------<  89  3.9   |  31  |  0.70    Ca    8.5      25 Jan 2018 07:00        Impression:  67yMale S/p Right Total Knee Replacement POD#1  Plan:  -  Pain management  -  Dvt prophylaxis with Lovenox  -  Daily Physical Therapy:  WBAT of the Right lower extremity with walker  -  Discharge planning: Home pending Physical therapy eval.  -  Continue with Post-op Antibiotics x 24hrs  -  Case d/w Dr. Amaro  -  Dressing change on POD#2  -  Incentive spirometry encouraged.

## 2018-01-25 NOTE — CONSULT NOTE ADULT - SUBJECTIVE AND OBJECTIVE BOX
Patient is a 67y old  Male who presents with a chief complaint of I will have my right knee replaced (24 Jan 2018 10:16)  Awake, alert comfortable in bed in NAD. S/p total right knee replacement. Former smoker.    INTERVAL HPI/OVERNIGHT EVENTS:  T(C): 36.5 (01-25-18 @ 05:50), Max: 36.9 (01-24-18 @ 18:15)  HR: 94 (01-25-18 @ 10:55) (39 - 104)  BP: 114/69 (01-25-18 @ 10:55) (100/55 - 193/100)  RR: 16 (01-25-18 @ 05:50) (11 - 21)  SpO2: 98% (01-25-18 @ 10:55) (95% - 100%)  Wt(kg): --  I&O's Summary    24 Jan 2018 07:01  -  25 Jan 2018 07:00  --------------------------------------------------------  IN: 1300 mL / OUT: 150 mL / NET: 1150 mL        PAST MEDICAL & SURGICAL HISTORY:  Hemorrhoids  Seasonal allergies  Osteoarthritis of right knee  PE (pulmonary embolism): in 1998, off coumadin now  Arthritis  GERD (gastroesophageal reflux disease)  HLD (hyperlipidemia)  H/O ventral hernia repair  History of bilateral carpal tunnel release  S/P knee replacement, left      SOCIAL HISTORY  Alcohol:  Tobacco:  Illicit substance use:      FAMILY HISTORY:      LABS:                        12.8   5.5   )-----------( 89       ( 25 Jan 2018 07:00 )             39.8     01-25    137  |  103  |  17  ----------------------------<  89  3.9   |  31  |  0.70    Ca    8.5      25 Jan 2018 07:00          CAPILLARY BLOOD GLUCOSE                MEDICATIONS  (STANDING):  ascorbic acid 500 milliGRAM(s) Oral two times a day  dextrose 5% + sodium chloride 0.9%. 1000 milliLiter(s) (100 mL/Hr) IV Continuous <Continuous>  docusate sodium 100 milliGRAM(s) Oral three times a day  enoxaparin Injectable 30 milliGRAM(s) SubCutaneous every 12 hours  famotidine    Tablet 20 milliGRAM(s) Oral every 12 hours  ferrous    sulfate 325 milliGRAM(s) Oral three times a day with meals  folic acid 1 milliGRAM(s) Oral daily  gabapentin 100 milliGRAM(s) Oral three times a day  multivitamin 1 Tablet(s) Oral daily  senna 2 Tablet(s) Oral at bedtime  simvastatin 20 milliGRAM(s) Oral at bedtime  sodium chloride 0.9% lock flush 3 milliLiter(s) IV Push every 8 hours  traMADol 50 milliGRAM(s) Oral every 8 hours    MEDICATIONS  (PRN):  acetaminophen   Tablet 650 milliGRAM(s) Oral every 6 hours PRN For Temp over 38.3 C (100.94 F)  aluminum hydroxide/magnesium hydroxide/simethicone Suspension 30 milliLiter(s) Oral four times a day PRN Indigestion  fluticasone propionate 50 MICROgram(s)/spray Nasal Spray 1 Spray(s) Both Nostrils daily PRN seasonal allergies  HYDROmorphone  Injectable 0.5 milliGRAM(s) IV Push every 4 hours PRN Severe Pain  loratadine 10 milliGRAM(s) Oral daily PRN seasonal allergies  ondansetron Injectable 4 milliGRAM(s) IV Push every 6 hours PRN Nausea and/or Vomiting  oxyCODONE    IR 5 milliGRAM(s) Oral every 4 hours PRN Moderate Pain (4 - 6)  zolpidem 5 milliGRAM(s) Oral at bedtime PRN Insomnia      REVIEW OF SYSTEMS:  CONSTITUTIONAL: No fever, weight loss, or fatigue  EYES: No eye pain, visual disturbances, or discharge  ENMT:  No difficulty hearing, tinnitus, vertigo; No sinus or throat pain  NECK: No pain or stiffness  RESPIRATORY: No cough, wheezing, chills or hemoptysis; No shortness of breath  CARDIOVASCULAR: No chest pain, palpitations, dizziness, or leg swelling  GASTROINTESTINAL: No abdominal or epigastric pain. No nausea, vomiting, or hematemesis; No diarrhea or constipation. No melena or hematochezia.  GENITOURINARY: No dysuria, frequency, hematuria, or incontinence  NEUROLOGICAL: No headaches, memory loss, loss of strength, numbness, or tremors  SKIN: No itching, burning, rashes, or lesions   LYMPH NODES: No enlarged glands  ENDOCRINE: No heat or cold intolerance; No hair loss  MUSCULOSKELETAL: right knee pain  PSYCHIATRIC: No depression, anxiety, mood swings, or difficulty sleeping  HEME/LYMPH: No easy bruising, or bleeding gums  ALLERY AND IMMUNOLOGIC: No hives or eczema    PHYSICAL EXAM:  GENERAL: NAD, well-groomed, well-developed  HEAD:  Atraumatic, Normocephalic  EYES: EOMI, PERRLA, conjunctiva and sclera clear  ENMT: No tonsillar erythema, exudates, or enlargement; Moist mucous membranes, Good dentition, No lesions  NECK: Supple, No JVD, Normal thyroid  NERVOUS SYSTEM:  Alert & Oriented X3, Good concentration; Motor Strength 5/5 B/L upper and lower extremities; DTRs 2+ intact and symmetric  CHEST/LUNG: Bilateral inspiratory wheezes  HEART: Regular rate and rhythm; No murmurs, rubs, or gallops  ABDOMEN: Soft, Nontender, Nondistended; Bowel sounds present  EXTREMITIES:  Right knee dressing clean  LYMPH: No lymphadenopathy noted  SKIN: No rashes or lesions    RADIOLOGY & ADDITIONAL TESTS:    Imaging Personally Reviewed:  [x ] YES  [ ] NO    Consultant(s) Notes Reviewed:  [x YES  [ ] NO        Care Discussed with Consultants/Other Providers [ x] YES  [ ] NO

## 2018-01-26 LAB
ANION GAP SERPL CALC-SCNC: 5 MMOL/L — SIGNIFICANT CHANGE UP (ref 5–17)
BUN SERPL-MCNC: 12 MG/DL — SIGNIFICANT CHANGE UP (ref 7–18)
CALCIUM SERPL-MCNC: 9.2 MG/DL — SIGNIFICANT CHANGE UP (ref 8.4–10.5)
CHLORIDE SERPL-SCNC: 101 MMOL/L — SIGNIFICANT CHANGE UP (ref 96–108)
CO2 SERPL-SCNC: 31 MMOL/L — SIGNIFICANT CHANGE UP (ref 22–31)
CREAT SERPL-MCNC: 0.8 MG/DL — SIGNIFICANT CHANGE UP (ref 0.5–1.3)
GLUCOSE SERPL-MCNC: 84 MG/DL — SIGNIFICANT CHANGE UP (ref 70–99)
HCT VFR BLD CALC: 39.8 % — SIGNIFICANT CHANGE UP (ref 39–50)
HGB BLD-MCNC: 13 G/DL — SIGNIFICANT CHANGE UP (ref 13–17)
MCHC RBC-ENTMCNC: 31.6 PG — SIGNIFICANT CHANGE UP (ref 27–34)
MCHC RBC-ENTMCNC: 32.5 GM/DL — SIGNIFICANT CHANGE UP (ref 32–36)
MCV RBC AUTO: 97 FL — SIGNIFICANT CHANGE UP (ref 80–100)
PLATELET # BLD AUTO: 98 K/UL — LOW (ref 150–400)
POTASSIUM SERPL-MCNC: 4 MMOL/L — SIGNIFICANT CHANGE UP (ref 3.5–5.3)
POTASSIUM SERPL-SCNC: 4 MMOL/L — SIGNIFICANT CHANGE UP (ref 3.5–5.3)
RBC # BLD: 4.1 M/UL — LOW (ref 4.2–5.8)
RBC # FLD: 13.7 % — SIGNIFICANT CHANGE UP (ref 10.3–14.5)
SODIUM SERPL-SCNC: 137 MMOL/L — SIGNIFICANT CHANGE UP (ref 135–145)
WBC # BLD: 7.6 K/UL — SIGNIFICANT CHANGE UP (ref 3.8–10.5)
WBC # FLD AUTO: 7.6 K/UL — SIGNIFICANT CHANGE UP (ref 3.8–10.5)

## 2018-01-26 RX ADMIN — OXYCODONE HYDROCHLORIDE 5 MILLIGRAM(S): 5 TABLET ORAL at 13:35

## 2018-01-26 RX ADMIN — GABAPENTIN 100 MILLIGRAM(S): 400 CAPSULE ORAL at 05:01

## 2018-01-26 RX ADMIN — Medication 325 MILLIGRAM(S): at 12:54

## 2018-01-26 RX ADMIN — TRAMADOL HYDROCHLORIDE 50 MILLIGRAM(S): 50 TABLET ORAL at 14:55

## 2018-01-26 RX ADMIN — SODIUM CHLORIDE 3 MILLILITER(S): 9 INJECTION INTRAMUSCULAR; INTRAVENOUS; SUBCUTANEOUS at 22:16

## 2018-01-26 RX ADMIN — OXYCODONE HYDROCHLORIDE 5 MILLIGRAM(S): 5 TABLET ORAL at 09:16

## 2018-01-26 RX ADMIN — Medication 1 TABLET(S): at 12:54

## 2018-01-26 RX ADMIN — Medication 325 MILLIGRAM(S): at 18:12

## 2018-01-26 RX ADMIN — Medication 1 MILLIGRAM(S): at 12:54

## 2018-01-26 RX ADMIN — TRAMADOL HYDROCHLORIDE 50 MILLIGRAM(S): 50 TABLET ORAL at 06:00

## 2018-01-26 RX ADMIN — SENNA PLUS 2 TABLET(S): 8.6 TABLET ORAL at 21:53

## 2018-01-26 RX ADMIN — FAMOTIDINE 20 MILLIGRAM(S): 10 INJECTION INTRAVENOUS at 05:01

## 2018-01-26 RX ADMIN — TRAMADOL HYDROCHLORIDE 50 MILLIGRAM(S): 50 TABLET ORAL at 23:00

## 2018-01-26 RX ADMIN — SODIUM CHLORIDE 3 MILLILITER(S): 9 INJECTION INTRAMUSCULAR; INTRAVENOUS; SUBCUTANEOUS at 14:25

## 2018-01-26 RX ADMIN — Medication 325 MILLIGRAM(S): at 09:15

## 2018-01-26 RX ADMIN — GABAPENTIN 100 MILLIGRAM(S): 400 CAPSULE ORAL at 14:23

## 2018-01-26 RX ADMIN — SODIUM CHLORIDE 3 MILLILITER(S): 9 INJECTION INTRAMUSCULAR; INTRAVENOUS; SUBCUTANEOUS at 06:44

## 2018-01-26 RX ADMIN — Medication 650 MILLIGRAM(S): at 00:15

## 2018-01-26 RX ADMIN — ENOXAPARIN SODIUM 30 MILLIGRAM(S): 100 INJECTION SUBCUTANEOUS at 05:03

## 2018-01-26 RX ADMIN — OXYCODONE HYDROCHLORIDE 5 MILLIGRAM(S): 5 TABLET ORAL at 12:56

## 2018-01-26 RX ADMIN — OXYCODONE HYDROCHLORIDE 5 MILLIGRAM(S): 5 TABLET ORAL at 09:45

## 2018-01-26 RX ADMIN — TRAMADOL HYDROCHLORIDE 50 MILLIGRAM(S): 50 TABLET ORAL at 05:01

## 2018-01-26 RX ADMIN — FAMOTIDINE 20 MILLIGRAM(S): 10 INJECTION INTRAVENOUS at 18:13

## 2018-01-26 RX ADMIN — GABAPENTIN 100 MILLIGRAM(S): 400 CAPSULE ORAL at 21:54

## 2018-01-26 RX ADMIN — ENOXAPARIN SODIUM 30 MILLIGRAM(S): 100 INJECTION SUBCUTANEOUS at 18:12

## 2018-01-26 RX ADMIN — Medication 100 MILLIGRAM(S): at 05:01

## 2018-01-26 RX ADMIN — Medication 100 MILLIGRAM(S): at 21:54

## 2018-01-26 RX ADMIN — Medication 100 MILLIGRAM(S): at 14:23

## 2018-01-26 RX ADMIN — Medication 500 MILLIGRAM(S): at 18:12

## 2018-01-26 RX ADMIN — TRAMADOL HYDROCHLORIDE 50 MILLIGRAM(S): 50 TABLET ORAL at 14:21

## 2018-01-26 RX ADMIN — Medication 500 MILLIGRAM(S): at 05:00

## 2018-01-26 RX ADMIN — TRAMADOL HYDROCHLORIDE 50 MILLIGRAM(S): 50 TABLET ORAL at 21:56

## 2018-01-26 RX ADMIN — SIMVASTATIN 20 MILLIGRAM(S): 20 TABLET, FILM COATED ORAL at 21:53

## 2018-01-26 NOTE — PROGRESS NOTE ADULT - SUBJECTIVE AND OBJECTIVE BOX
Ortho Note POD# 2  67yMale    Diagnosis:  S/p Right Total Knee Replacement POD# 2    Patient is seen and evaluated at bedside; offers no acute complaints. Pain is mild; well controlled.  Has been OOB with PT; ambulation with walker    Vital Signs Last 24 Hrs  T(C): 37.3 (26 Jan 2018 05:15), Max: 38.3 (25 Jan 2018 23:54)  T(F): 99.1 (26 Jan 2018 05:15), Max: 101 (25 Jan 2018 23:54)  HR: 72 (26 Jan 2018 05:15) (72 - 94)  BP: 133/66 (26 Jan 2018 05:15) (111/57 - 133/66)  BP(mean): --  RR: 14 (26 Jan 2018 05:15) (14 - 18)  SpO2: 98% (26 Jan 2018 05:15) (96% - 98%)    Physical Exam:    General: AAOx3, in NAD, resting in bed.    Right knee:  In nl. alignment. Wound C/D/I; healing well.  Staples intact. Calves are soft, non-tender. 2+pulses. NVI.                          13.0   7.6   )-----------( 98       ( 26 Jan 2018 09:11 )             39.8     01-26    137  |  101  |  12  ----------------------------<  84  4.0   |  31  |  0.80    Ca    9.2      26 Jan 2018 09:11        Impression:  67yMale S/p Right total knee replacement POD# 2  Plan:  -  Continue pain management  -  DVT prophylaxis with Lovenox  -  Daily Physical Therapy:  WBAT on RLE with walker  -  Discharge planning for tomorrow  -  Dressing changed  -  Encouraged use of incentive spirometer  -  Case d/w Dr. Amaro

## 2018-01-26 NOTE — PROGRESS NOTE ADULT - SUBJECTIVE AND OBJECTIVE BOX
pt seen in icu [  ], reg med floor [  x ], bed [  ], chair at bedside [ x  ]  Awake, alert, comfortable in NAD  REVIEW OF SYSTEMS:    CONSTITUTIONAL: No weakness, fevers or chills  EYES/ENT: No visual changes;  No vertigo or throat pain   NECK: No pain or stiffness  RESPIRATORY: No cough, wheezing, hemoptysis; No shortness of breath  CARDIOVASCULAR: No chest pain or palpitations  GASTROINTESTINAL: No abdominal or epigastric pain. No nausea, vomiting, or hematemesis; No diarrhea or constipation. No melena or hematochezia.  GENITOURINARY: No dysuria, frequency or hematuria  NEUROLOGICAL: No numbness or weakness  SKIN: No itching, burning, rashes, or lesions   All other review of systems is negative unless indicated above.    Physical Exam    General: WN/WD NAD  Neurology: A&Ox3, nonfocal, OY x 4  Respiratory: Few insp wheezes bilatterally  CV: RRR, S1S2, no murmurs, rubs or gallops  Abdominal: Soft, NT, ND +BS, Last BM  Extremities: No edema, + peripheral pulses  Right knee dressing clean    Allergies  No Known Allergies      Health Issues  Primary osteoarthritis of right knee  Hemorrhoids  Seasonal allergies  Osteoarthritis of right knee  PE (pulmonary embolism)  Arthritis  GERD (gastroesophageal reflux disease)  HLD (hyperlipidemia)  H/O ventral hernia repair  History of bilateral carpal tunnel release  S/P knee replacement, left      Vitals  T(F): 99.1 (01-26-18 @ 05:15), Max: 101 (01-25-18 @ 23:54)  HR: 89 (01-26-18 @ 10:58) (72 - 92)  BP: 126/76 (01-26-18 @ 10:58) (111/57 - 133/66)  RR: 14 (01-26-18 @ 05:15) (14 - 18)  SpO2: 98% (01-26-18 @ 10:58) (96% - 98%)  Wt(kg): --  CAPILLARY BLOOD GLUCOSE          Labs                          13.0   7.6   )-----------( 98       ( 26 Jan 2018 09:11 )             39.8       01-26    137  |  101  |  12  ----------------------------<  84  4.0   |  31  |  0.80    Ca    9.2      26 Jan 2018 09:11              Radiology Results      Meds    MEDICATIONS  (STANDING):  ascorbic acid 500 milliGRAM(s) Oral two times a day  dextrose 5% + sodium chloride 0.9%. 1000 milliLiter(s) (100 mL/Hr) IV Continuous <Continuous>  docusate sodium 100 milliGRAM(s) Oral three times a day  enoxaparin Injectable 30 milliGRAM(s) SubCutaneous every 12 hours  famotidine    Tablet 20 milliGRAM(s) Oral every 12 hours  ferrous    sulfate 325 milliGRAM(s) Oral three times a day with meals  folic acid 1 milliGRAM(s) Oral daily  gabapentin 100 milliGRAM(s) Oral three times a day  multivitamin 1 Tablet(s) Oral daily  senna 2 Tablet(s) Oral at bedtime  simvastatin 20 milliGRAM(s) Oral at bedtime  sodium chloride 0.9% lock flush 3 milliLiter(s) IV Push every 8 hours  traMADol 50 milliGRAM(s) Oral every 8 hours      MEDICATIONS  (PRN):  acetaminophen   Tablet 650 milliGRAM(s) Oral every 6 hours PRN For Temp over 38.3 C (100.94 F)  aluminum hydroxide/magnesium hydroxide/simethicone Suspension 30 milliLiter(s) Oral four times a day PRN Indigestion  benzocaine 15 mG/menthol 3.6 mG Lozenge 1 Lozenge Oral every 4 hours PRN Sore Throat  fluticasone propionate 50 MICROgram(s)/spray Nasal Spray 1 Spray(s) Both Nostrils daily PRN seasonal allergies  HYDROmorphone  Injectable 0.5 milliGRAM(s) IV Push every 4 hours PRN Severe Pain  loratadine 10 milliGRAM(s) Oral daily PRN seasonal allergies  ondansetron Injectable 4 milliGRAM(s) IV Push every 6 hours PRN Nausea and/or Vomiting  oxyCODONE    IR 5 milliGRAM(s) Oral every 4 hours PRN Moderate Pain (4 - 6)

## 2018-01-27 VITALS
DIASTOLIC BLOOD PRESSURE: 79 MMHG | RESPIRATION RATE: 18 BRPM | HEART RATE: 99 BPM | OXYGEN SATURATION: 96 % | SYSTOLIC BLOOD PRESSURE: 128 MMHG | TEMPERATURE: 99 F

## 2018-01-27 LAB
HCT VFR BLD CALC: 39.6 % — SIGNIFICANT CHANGE UP (ref 39–50)
HGB BLD-MCNC: 12.2 G/DL — LOW (ref 13–17)
MCHC RBC-ENTMCNC: 29.5 PG — SIGNIFICANT CHANGE UP (ref 27–34)
MCHC RBC-ENTMCNC: 30.9 GM/DL — LOW (ref 32–36)
MCV RBC AUTO: 95.3 FL — SIGNIFICANT CHANGE UP (ref 80–100)
PLATELET # BLD AUTO: 109 K/UL — LOW (ref 150–400)
RBC # BLD: 4.16 M/UL — LOW (ref 4.2–5.8)
RBC # FLD: 13.7 % — SIGNIFICANT CHANGE UP (ref 10.3–14.5)
WBC # BLD: 7.8 K/UL — SIGNIFICANT CHANGE UP (ref 3.8–10.5)
WBC # FLD AUTO: 7.8 K/UL — SIGNIFICANT CHANGE UP (ref 3.8–10.5)

## 2018-01-27 PROCEDURE — 97116 GAIT TRAINING THERAPY: CPT

## 2018-01-27 PROCEDURE — C1776: CPT

## 2018-01-27 PROCEDURE — 86901 BLOOD TYPING SEROLOGIC RH(D): CPT

## 2018-01-27 PROCEDURE — C1713: CPT

## 2018-01-27 PROCEDURE — 73562 X-RAY EXAM OF KNEE 3: CPT

## 2018-01-27 PROCEDURE — 97162 PT EVAL MOD COMPLEX 30 MIN: CPT

## 2018-01-27 PROCEDURE — 97530 THERAPEUTIC ACTIVITIES: CPT

## 2018-01-27 PROCEDURE — 86850 RBC ANTIBODY SCREEN: CPT

## 2018-01-27 PROCEDURE — 85027 COMPLETE CBC AUTOMATED: CPT

## 2018-01-27 PROCEDURE — 97110 THERAPEUTIC EXERCISES: CPT

## 2018-01-27 PROCEDURE — 80048 BASIC METABOLIC PNL TOTAL CA: CPT

## 2018-01-27 PROCEDURE — 86900 BLOOD TYPING SEROLOGIC ABO: CPT

## 2018-01-27 RX ORDER — GABAPENTIN 400 MG/1
1 CAPSULE ORAL
Qty: 7 | Refills: 0
Start: 2018-01-27

## 2018-01-27 RX ORDER — SENNA PLUS 8.6 MG/1
2 TABLET ORAL
Qty: 6 | Refills: 0
Start: 2018-01-27 | End: 2018-01-29

## 2018-01-27 RX ORDER — TRAMADOL HYDROCHLORIDE 50 MG/1
50 TABLET ORAL EVERY 6 HOURS
Qty: 0 | Refills: 0 | Status: DISCONTINUED | OUTPATIENT
Start: 2018-01-27 | End: 2018-01-27

## 2018-01-27 RX ORDER — GABAPENTIN 400 MG/1
300 CAPSULE ORAL EVERY 8 HOURS
Qty: 0 | Refills: 0 | Status: DISCONTINUED | OUTPATIENT
Start: 2018-01-27 | End: 2018-01-27

## 2018-01-27 RX ADMIN — ENOXAPARIN SODIUM 30 MILLIGRAM(S): 100 INJECTION SUBCUTANEOUS at 05:38

## 2018-01-27 RX ADMIN — SODIUM CHLORIDE 3 MILLILITER(S): 9 INJECTION INTRAMUSCULAR; INTRAVENOUS; SUBCUTANEOUS at 05:44

## 2018-01-27 RX ADMIN — Medication 1 MILLIGRAM(S): at 12:03

## 2018-01-27 RX ADMIN — TRAMADOL HYDROCHLORIDE 50 MILLIGRAM(S): 50 TABLET ORAL at 05:43

## 2018-01-27 RX ADMIN — GABAPENTIN 100 MILLIGRAM(S): 400 CAPSULE ORAL at 05:41

## 2018-01-27 RX ADMIN — Medication 325 MILLIGRAM(S): at 12:03

## 2018-01-27 RX ADMIN — Medication 500 MILLIGRAM(S): at 05:41

## 2018-01-27 RX ADMIN — TRAMADOL HYDROCHLORIDE 50 MILLIGRAM(S): 50 TABLET ORAL at 10:05

## 2018-01-27 RX ADMIN — TRAMADOL HYDROCHLORIDE 50 MILLIGRAM(S): 50 TABLET ORAL at 09:05

## 2018-01-27 RX ADMIN — FAMOTIDINE 20 MILLIGRAM(S): 10 INJECTION INTRAVENOUS at 05:41

## 2018-01-27 RX ADMIN — Medication 325 MILLIGRAM(S): at 09:07

## 2018-01-27 RX ADMIN — Medication 100 MILLIGRAM(S): at 05:41

## 2018-01-27 RX ADMIN — Medication 1 TABLET(S): at 12:03

## 2018-01-27 NOTE — PROGRESS NOTE ADULT - SUBJECTIVE AND OBJECTIVE BOX
67yMale    Diagnosis:  S/p Right Total Knee Replacement POD#3    Patient was seen and evaluated at bedside. Patient with no acute complaints.   Pain is  well controlled.  Awaiting PT for ambulation.     Denies CP/SOB, dyspnea, paresthesias, N/V/D, palpitations.     Vital Signs Last 24 Hrs  T(C): 37 (27 Jan 2018 05:23), Max: 37.6 (26 Jan 2018 14:34)  T(F): 98.6 (27 Jan 2018 05:23), Max: 99.7 (26 Jan 2018 14:34)  HR: 79 (27 Jan 2018 05:23) (79 - 89)  BP: 117/54 (27 Jan 2018 05:23) (117/54 - 126/76)  BP(mean): --  RR: 18 (27 Jan 2018 05:23) (16 - 18)  SpO2: 95% (27 Jan 2018 05:23) (95% - 98%)  I&O's Detail      Physical Exam:    General: AAOx3, NAD, resting comfortably in bed.    Right knee:  Dressing removed-> Wound is clean, dry, with staples intact. Mild ecchymoses/erythema over the staples. No wound dehiscence, no drainage. Skin is pink and warm. SILT.  No drainage.   Lower extremities:  No calf tenderness, calves are soft. 2+pulses. NVI. 5/5 Strength of EHL/TA/gastrocnemius B/L.  Good capillary refill. SILT.                          12.2   7.8   )-----------( 109      ( 27 Jan 2018 07:14 )             39.6     01-26    137  |  101  |  12  ----------------------------<  84  4.0   |  31  |  0.80    Ca    9.2      26 Jan 2018 09:11        Impression:  67yMale S/p Right Total Knee Replacement POD#3  Plan:  -  Pain management  -  Dvt prophylaxis with Lovenox  -  Daily Physical Therapy:  WBAT of the right lower extremity with walker  -  Discharge planning: Home   -  Case d/w Dr. Amaro  -  Dressing changed today, new dressing applied.  -  Incentive spirometry encouraged.

## 2018-01-27 NOTE — PROGRESS NOTE ADULT - PROBLEM SELECTOR PLAN 1
pain control  incentive spirometry  ortho follow up  Pt/Rehab  DVT PPX
pain control  incentive spirometry  ortho follow up  Pt/Rehab  DVT PPX
- platelets - 98.  pt takes celebrex as outpt.  can resume.   - pt is on gabapentin 300mg po q 8 - please resume home dose.  - no oxycodone  - change tramadol to 50mg po q 6 hours prn  - stool softeners  - ice to extremity.  - will have ortho reevaluate extremity for increased erythema, warmth, and edema

## 2018-01-27 NOTE — PROGRESS NOTE ADULT - SUBJECTIVE AND OBJECTIVE BOX
pt seen in icu [  ], reg med floor [   x], bed [  ], chair at bedside [ x  ]  Awake, alert, comfortable in bed in NAD  REVIEW OF SYSTEMS:    CONSTITUTIONAL: No weakness, fevers or chills  EYES/ENT: No visual changes;  No vertigo or throat pain   NECK: No pain or stiffness  RESPIRATORY: No cough but wheezing no hemoptysis; No shortness of breath  CARDIOVASCULAR: No chest pain or palpitations  GASTROINTESTINAL: No abdominal or epigastric pain. No nausea, vomiting, or hematemesis; No diarrhea or constipation. No melena or hematochezia.  GENITOURINARY: No dysuria, frequency or hematuria  NEUROLOGICAL: No numbness or weakness  SKIN: No itching, burning, rashes, or lesions   All other review of systems is negative unless indicated above.    Physical Exam    General: WN/WD NAD  Neurology: A&Ox3, nonfocal, YO x 4  Respiratory: inspiratory wheezes post  CV: RRR, S1S2, no murmurs, rubs or gallops  Abdominal: Soft, NT, ND +BS, Last BM  Extremities: No edema, + peripheral pulses      Allergies  No Known Allergies      Health Issues  Primary osteoarthritis of right knee  Hemorrhoids  Seasonal allergies  Osteoarthritis of right knee  PE (pulmonary embolism)  Arthritis  GERD (gastroesophageal reflux disease)  HLD (hyperlipidemia)  H/O ventral hernia repair  History of bilateral carpal tunnel release  S/P knee replacement, left      Vitals  T(F): 98.6 (01-27-18 @ 05:23), Max: 99.7 (01-26-18 @ 14:34)  HR: 94 (01-27-18 @ 11:05) (79 - 94)  BP: 100/88 (01-27-18 @ 11:05) (100/88 - 125/72)  RR: 18 (01-27-18 @ 05:23) (16 - 18)  SpO2: 98% (01-27-18 @ 11:05) (95% - 98%)  Wt(kg): --  CAPILLARY BLOOD GLUCOSE          Labs                          12.2   7.8   )-----------( 109      ( 27 Jan 2018 07:14 )             39.6       01-26    137  |  101  |  12  ----------------------------<  84  4.0   |  31  |  0.80    Ca    9.2      26 Jan 2018 09:11              Radiology Results      Meds    MEDICATIONS  (STANDING):  ascorbic acid 500 milliGRAM(s) Oral two times a day  dextrose 5% + sodium chloride 0.9%. 1000 milliLiter(s) (100 mL/Hr) IV Continuous <Continuous>  docusate sodium 100 milliGRAM(s) Oral three times a day  enoxaparin Injectable 30 milliGRAM(s) SubCutaneous every 12 hours  famotidine    Tablet 20 milliGRAM(s) Oral every 12 hours  ferrous    sulfate 325 milliGRAM(s) Oral three times a day with meals  folic acid 1 milliGRAM(s) Oral daily  gabapentin 300 milliGRAM(s) Oral every 8 hours  multivitamin 1 Tablet(s) Oral daily  senna 2 Tablet(s) Oral at bedtime  simvastatin 20 milliGRAM(s) Oral at bedtime  sodium chloride 0.9% lock flush 3 milliLiter(s) IV Push every 8 hours      MEDICATIONS  (PRN):  acetaminophen   Tablet 650 milliGRAM(s) Oral every 6 hours PRN For Temp over 38.3 C (100.94 F)  aluminum hydroxide/magnesium hydroxide/simethicone Suspension 30 milliLiter(s) Oral four times a day PRN Indigestion  benzocaine 15 mG/menthol 3.6 mG Lozenge 1 Lozenge Oral every 4 hours PRN Sore Throat  fluticasone propionate 50 MICROgram(s)/spray Nasal Spray 1 Spray(s) Both Nostrils daily PRN seasonal allergies  loratadine 10 milliGRAM(s) Oral daily PRN seasonal allergies  ondansetron Injectable 4 milliGRAM(s) IV Push every 6 hours PRN Nausea and/or Vomiting  traMADol 50 milliGRAM(s) Oral every 6 hours PRN Severe Pain (7 - 10)

## 2018-01-27 NOTE — PROGRESS NOTE ADULT - SUBJECTIVE AND OBJECTIVE BOX
Chief Complaint: right knee pain    HPI:   67y Male, s/p right knee replacement, pod#3.  Pt complaining of right knee pain.  + edema, erythema and warmth of extremity.  +scant serosanguinous drainage.  No fevers or chills.  Oxycodone made pt feel nauseous.       PAIN SCORE:     6/10    SCALE USED: (1-10 VNRS)    Allergies    No Known Allergies    Intolerances      MEDICATIONS  (STANDING):  ascorbic acid 500 milliGRAM(s) Oral two times a day  dextrose 5% + sodium chloride 0.9%. 1000 milliLiter(s) (100 mL/Hr) IV Continuous <Continuous>  docusate sodium 100 milliGRAM(s) Oral three times a day  enoxaparin Injectable 30 milliGRAM(s) SubCutaneous every 12 hours  famotidine    Tablet 20 milliGRAM(s) Oral every 12 hours  ferrous    sulfate 325 milliGRAM(s) Oral three times a day with meals  folic acid 1 milliGRAM(s) Oral daily  gabapentin 300 milliGRAM(s) Oral every 8 hours  multivitamin 1 Tablet(s) Oral daily  senna 2 Tablet(s) Oral at bedtime  simvastatin 20 milliGRAM(s) Oral at bedtime  sodium chloride 0.9% lock flush 3 milliLiter(s) IV Push every 8 hours    MEDICATIONS  (PRN):  acetaminophen   Tablet 650 milliGRAM(s) Oral every 6 hours PRN For Temp over 38.3 C (100.94 F)  aluminum hydroxide/magnesium hydroxide/simethicone Suspension 30 milliLiter(s) Oral four times a day PRN Indigestion  benzocaine 15 mG/menthol 3.6 mG Lozenge 1 Lozenge Oral every 4 hours PRN Sore Throat  fluticasone propionate 50 MICROgram(s)/spray Nasal Spray 1 Spray(s) Both Nostrils daily PRN seasonal allergies  loratadine 10 milliGRAM(s) Oral daily PRN seasonal allergies  ondansetron Injectable 4 milliGRAM(s) IV Push every 6 hours PRN Nausea and/or Vomiting  traMADol 50 milliGRAM(s) Oral every 6 hours PRN Severe Pain (7 - 10)      PHYSICAL EXAM:    Vital Signs Last 24 Hrs  T(C): 37 (27 Jan 2018 05:23), Max: 37.6 (26 Jan 2018 14:34)  T(F): 98.6 (27 Jan 2018 05:23), Max: 99.7 (26 Jan 2018 14:34)  HR: 79 (27 Jan 2018 05:23) (79 - 89)  BP: 117/54 (27 Jan 2018 05:23) (117/54 - 126/76)  BP(mean): --  RR: 18 (27 Jan 2018 05:23) (16 - 18)  SpO2: 95% (27 Jan 2018 05:23) (95% - 98%)             LABS:                          13.0   7.6   )-----------( 98       ( 26 Jan 2018 09:11 )             39.8     01-26    137  |  101  |  12  ----------------------------<  84  4.0   |  31  |  0.80    Ca    9.2      26 Jan 2018 09:11            Drug Screen:        RADIOLOGY:

## 2018-07-13 ENCOUNTER — EMERGENCY (EMERGENCY)
Facility: HOSPITAL | Age: 68
LOS: 1 days | Discharge: HOME | End: 2018-07-13
Attending: EMERGENCY MEDICINE | Admitting: EMERGENCY MEDICINE
Payer: MEDICARE

## 2018-07-13 VITALS
OXYGEN SATURATION: 98 % | TEMPERATURE: 98 F | HEART RATE: 74 BPM | DIASTOLIC BLOOD PRESSURE: 70 MMHG | RESPIRATION RATE: 16 BRPM | SYSTOLIC BLOOD PRESSURE: 111 MMHG

## 2018-07-13 DIAGNOSIS — Z98.890 OTHER SPECIFIED POSTPROCEDURAL STATES: Chronic | ICD-10-CM

## 2018-07-13 DIAGNOSIS — Z96.652 PRESENCE OF LEFT ARTIFICIAL KNEE JOINT: Chronic | ICD-10-CM

## 2018-07-13 PROCEDURE — 99282 EMERGENCY DEPT VISIT SF MDM: CPT

## 2018-07-13 NOTE — ED PROVIDER NOTE - OBJECTIVE STATEMENT
67y M with no PMHx presents to the ED for bee stings to right hand today at 10AM. States pain travels up arm. Pt was working in backyard when he was stung. Put benadryl cream on sting. States he was stung by a bee 2 days ago also. Currently taking simvastatin and dexilant. Past surgical hx of bilateral knee replacement, carpel tunnel, and stomach hernia. No difficulty breathing or throat tightness. No allergies. No smoke or drink

## 2018-07-13 NOTE — ED ADULT TRIAGE NOTE - CHIEF COMPLAINT QUOTE
Pt c/o several bee stings to right arm sustained 20 min ago, c/o pain traveling up the arm, sites warm to touch, denies difficulty breathing, denies pruritus. Appears in NAD

## 2018-07-13 NOTE — ED PROVIDER NOTE - PHYSICAL EXAMINATION
ATTENDING PHYSICAL EXAM DR. MCCRACKEN ***GEN - NAD; well appearing; A+O x3 ***HEAD - NC/AT ***EYES/NOSE - PERRL, EOMI, mucous membranes moist, no discharge  ***PULMONARY - CTA b/l, symmetric breath sounds. ***CARDIAC -s1s2, RRR, no M,G,R  ***ABDOMEN - +BS, ND, NT, soft, no guarding, no rebound, no masses   ***BACK - no CVA tenderness, Normal  spine ***EXTREMITIES - symmetric pulses, 2+ dp, capillary refill < 2 seconds, no clubbing, no cyanosis, no edema ***SKIN - no rash or bruising, RUE pain to dorsum of hand, no significant swelling or erythema  ***NEUROLOGIC - alert

## 2019-01-10 ENCOUNTER — EMERGENCY (EMERGENCY)
Facility: HOSPITAL | Age: 69
LOS: 0 days | Discharge: HOME | End: 2019-01-10
Attending: EMERGENCY MEDICINE
Payer: COMMERCIAL

## 2019-01-10 VITALS
HEART RATE: 65 BPM | RESPIRATION RATE: 17 BRPM | SYSTOLIC BLOOD PRESSURE: 116 MMHG | DIASTOLIC BLOOD PRESSURE: 64 MMHG | OXYGEN SATURATION: 99 % | TEMPERATURE: 99 F

## 2019-01-10 VITALS
RESPIRATION RATE: 18 BRPM | TEMPERATURE: 98 F | OXYGEN SATURATION: 98 % | DIASTOLIC BLOOD PRESSURE: 61 MMHG | HEART RATE: 66 BPM | WEIGHT: 205.03 LBS | SYSTOLIC BLOOD PRESSURE: 109 MMHG | HEIGHT: 70 IN

## 2019-01-10 DIAGNOSIS — I95.9 HYPOTENSION, UNSPECIFIED: ICD-10-CM
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
## 2019-01-10 DIAGNOSIS — R07.89 OTHER CHEST PAIN: ICD-10-CM

## 2019-01-10 DIAGNOSIS — Z98.890 OTHER SPECIFIED POSTPROCEDURAL STATES: Chronic | ICD-10-CM
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
## 2019-01-10 DIAGNOSIS — Z96.652 PRESENCE OF LEFT ARTIFICIAL KNEE JOINT: Chronic | ICD-10-CM

## 2019-01-10 PROBLEM — M17.11 UNILATERAL PRIMARY OSTEOARTHRITIS, RIGHT KNEE: Chronic | Status: ACTIVE | Noted: 2018-01-16

## 2019-01-10 PROBLEM — K64.9 UNSPECIFIED HEMORRHOIDS: Chronic | Status: ACTIVE | Noted: 2018-01-23

## 2019-01-10 PROBLEM — J30.2 OTHER SEASONAL ALLERGIC RHINITIS: Chronic | Status: ACTIVE | Noted: 2018-01-23

## 2019-01-10 PROCEDURE — 99284 EMERGENCY DEPT VISIT MOD MDM: CPT

## 2019-01-10 PROCEDURE — 71101 X-RAY EXAM UNILAT RIBS/CHEST: CPT | Mod: 26,RT

## 2019-01-10 PROCEDURE — 93010 ELECTROCARDIOGRAM REPORT: CPT

## 2019-01-10 RX ORDER — ACETAMINOPHEN 500 MG
650 TABLET ORAL ONCE
Qty: 0 | Refills: 0 | Status: COMPLETED | OUTPATIENT
Start: 2019-01-10 | End: 2019-01-10

## 2019-01-10 RX ADMIN — Medication 650 MILLIGRAM(S): at 15:59
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
## 2019-01-10 NOTE — ED ADULT NURSE NOTE - NSIMPLEMENTINTERV_GEN_ALL_ED
Implemented All Universal Safety Interventions:  [place] to call system. Call bell, personal items and telephone within reach. Instruct patient to call for assistance. Room bathroom lighting operational. Non-slip footwear when patient is off stretcher. Physically safe environment: no spills, clutter or unnecessary equipment. Stretcher in lowest position, wheels locked, appropriate side rails in place.

## 2019-01-10 NOTE — ED PROVIDER NOTE - PHYSICAL EXAMINATION
Gen: Alert, Well appearing. NAD    Head: NC, AT, PERRL, EOMI, normal lids/conjunctiva   ENT: Bilateral TM WNL, normal hearing, patent oropharynx without erythema/exudate, uvula midline  Neck: supple, no tenderness/meningismus/JVD   Pulm: Bilateral clear BS, normal resp effort, no wheeze/stridor/retractions  CV: RRR, no M/R/G, +dist pulses , + mild rt upper cw tenderness, no crepitus  Abd: soft, NT/ND, +BS, no guarding/rebound tenderness  Mskel: no edema/erythema/cyanosis   Skin: no rash , no seatbelt sign  Neuro: AAOx3, no sensory/motor deficits, CN 2-12 intact
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
## 2019-02-10 ENCOUNTER — TRANSCRIPTION ENCOUNTER (OUTPATIENT)
Age: 69
End: 2019-02-10

## 2020-05-18 NOTE — ED ADULT TRIAGE NOTE - PAIN RATING/NUMBER SCALE (0-10): ACTIVITY
Occupational Therapy Re evaluation    Visit Count: 4    Precautions: none    SUBJECTIVE   Present and reporting subjective information: mother  Reports that ruddy was good for a week but then \"her sleep went out of wack.\"    Patient reports that she has not been completing HEP.    Current Pain/Behaviors: No pain.    Functional Change: No change    OBJECTIVE     Standardized Test:   Bruininks-Oseretsky Test of Motor Proficiency, Second Edition  The Bruininks-Oseretsky Test of Motor Proficiency, Second Edition (BOT-2) is an individually administered test that uses engaging, goal-directed activities to measure a wide array of motor skills in individuals aged 4 through 21. The BOT-2 uses a subtest and composite structure that highlights motor performance in the broad functional areas of stability, mobility, strength, coordination, and object manipulation. There are four motorarea composites, each comprising two of the eight BOT-2 subtests, and a Total Motor Composite, which comprises all four composites and provides the most reliable measure of overall motor proficiency.  Fine Manual Control.  This motor-area composite measures control and coordination of the distal musculature of the hands and fingers, especially for grasping, drawing, and cutting.  The Fine Motor Precision subtest consists of activities that require precise control of finger and hand movement. The object is to draw, fold, or cut within a specified boundary.  The Fine Motor Integration subtest requires the examinee to reproduce drawings of various geometric shapes that range in complexity from a [place] to overlapping pencils.  Manual Coordination.  This motor-area composite measures control and coordination of the arms and hands, especially for object manipulation.  The Manual Dexterity subtest uses goal-directed activities that involve reaching, grasping, and bimanual coordination with small objects. Emphasis is placed on accuracy. However, the  items are timed to more precisely differentiate levels of dexterity.  The Upper-Limb Coordination subtest consists off activities designed to measure visual tracking with coordinated arm and hand movement.  Body Coordination.  This motor-area composite measures control and coordination of the large musculature that aids in posture and balance.  The Bilateral Coordination subtest measures the motor skills involved in playing sports and many recreational games. The tasks require body control, and sequential and simultaneous coordination of the upper and lower limbs.  The Balance subtest evaluates motor-control skills that are integral for maintaining posture when standing, walking, or reaching.  Strength and Agility.  This motor-area composite measures control and coordination of the large musculature involved in locomotion, especially in recreational and competitive sports.  The Running Speed and Agility subtest assesses running speed and agility.  The Strength subtest is designed to measure trunk strength, upper body strength, and lower body strength.    At time of test patient 10 years, 7 months, 5 days.  (11:7)  Subtest Total Point Score Scale Score Standard Score Percentile Rank Age Equivalent (years:months) Descriptive Category   Fine Motor Precision 36 10   8:3-8:5 Below Average   Fine Motor Integration 38 14   10:0-10:2 Average   Fine Manual Control  24 41 18  Average   Manual Dexterity 25 8   7:9-7:11 Below Average   Upper Limb Coordination 27 7   7:9-7:11 Below Average   Manual Coordination  15 31 3  Below Average   Bilateral Coordination 16 6   5:6-5:7 Below Average        Treatment       Skilled input: testing.     Home Program:   Restart HEP.     Suggestions for next session as indicated: progress per plan of care,     ASSESSMENT   Patient displays appropriate hand position using scissors during test but did appear to have difficulty deciding where and how to start cut to get from edge of paper to [place]  that was requested to be cut out.  She repositioned scissors 4 times along two edges of paper before finally committing to a cut.  Patient does hold pencil with appropriate fingers but rolls forearm to place ulnar side of hand and wrist along paper and wrist flexed so that she writing or drawing from above pencil tip.  Patient applies significant amount of pressure through writing instrument as well.  Display poor coordination of distal and proximal joints of upper and lower body.  Appears to have poor strength and endurance.  Patient did not follow through with home program and did not appear as though she wished to be here for treatment session.     Pain/behaviors after treatment: None  Result of above outlined education: Verbalizes understanding        GOALS:   To be obtained by end of this plan of care:  1. Patient's family will verbalize understanding of her sensory needs and be able to follow through on home and school activities that will assist him in maintaining an appropriate level of arousal as measured through verbal recall of information with 80% accuracy.  NOT MET  2. Patient will attain and maintain an appropriate level of arousal for activities 4 hours out of the day with 2 rest breaks as measured by teacher and family report.  NOT MET.  With covid shutting down schools patient had less structure.  3. Patient will demonstrate knowledge of sensory diet with 90 % accuracy as measured by verbal and visual recall. NOT MET  4. Patient will demonstrate ability to implement sensory diet with 90% accuracy as measured by skill demonstration per parent report in 4 out of 5 consecutive days. NOT MET.  5. Patient display improvement of involved strength of RUE as evidenced by being able to participate in therapeutic activity requiring use of arms to propel self either with contact to floor or pulling through a rope for 2 minutes before requiring a break so as to be able to generalize into dressing tasks that  require core stabilization while dynamically moving BUE such as retrieving clothing from high drawer or closet mis in preparation of independent dressing. NOT MET.  Continued poor endurance and strength.   6. Patient will complete BOT-2 testing to determine baseline function as per this assessment tool to be able to determine her performance as compared to peer group to be able to further specific areas that she has limitation so that these may be addressed to assist her in progressing to her full potential.  MET.   Patient Below Average for 4 of 5 sub tests completed.   7. Produce BOT-2 score for Fine Motor Integration that falls within 6 month of chronological age at time of testing as evidence of improved hand strength and coordination to complete age appropriate dressing and school performance.  NOT MET. GAS score -2.  Patient 11:7 at time of test and Fine Motor Integration score 10:0 - 10:2.   Patient 17 months.     Goal Attainment Scale (GAS) Goals  -2 16. Current level of performance: 10 months of age   -1 16. Greater the current level of performance: 8 months of age   0 16. Expected level of performance: 6 month of Chronological age   +1 16. Greater than expected level of performance: within 2 months   +2 16. Much greater than expected level of performance: 6 months above age        Plan   Continue current plan of care with attendance once a week to 7/31/20.    Procedures and total treatment time documented in Time Entry flowsheet.   6

## 2020-12-03 NOTE — H&P PST ADULT - GENITOURINARY
Stroke Education Note    Patient: Pantera Newman Date: 12/3/2020   : 1951 Attending: Jemma Smith DO       Patient admitted with ICH and SAH. Patient's uncontrollable risk factors areAge over 55 years;Male;Prior Stroke/TIA/AMI (20 1247) . Patient's controllable risk factors  Physical inactivity (20 124).    Stroke teaching deferred at this time. Unable to teach patient d/t AMS, and MD with patient.  Will return at a later time to attempt education.    Ariana De Leon, RN  Stroke Nurse Navigator   details…

## 2021-11-04 NOTE — ED ADULT TRIAGE NOTE - SOURCE OF INFORMATION
Mother/Patient/EMS No. [id number] screening performed.  STOP BANG Legend: 0-2 = LOW Risk; 3-4 = INTERMEDIATE Risk; 5-8 = HIGH Risk

## 2022-05-20 ENCOUNTER — EMERGENCY (EMERGENCY)
Facility: HOSPITAL | Age: 72
LOS: 1 days | Discharge: HOME | End: 2022-05-20
Admitting: EMERGENCY MEDICINE
Payer: MEDICARE

## 2022-05-20 DIAGNOSIS — Z96.652 PRESENCE OF LEFT ARTIFICIAL KNEE JOINT: Chronic | ICD-10-CM

## 2022-05-20 DIAGNOSIS — Z98.890 OTHER SPECIFIED POSTPROCEDURAL STATES: Chronic | ICD-10-CM

## 2022-05-20 PROCEDURE — 74176 CT ABD & PELVIS W/O CONTRAST: CPT | Mod: 26

## 2022-05-20 PROCEDURE — 76705 ECHO EXAM OF ABDOMEN: CPT | Mod: 26

## 2022-05-20 PROCEDURE — 99285 EMERGENCY DEPT VISIT HI MDM: CPT

## 2022-05-23 DIAGNOSIS — R10.9 UNSPECIFIED ABDOMINAL PAIN: ICD-10-CM

## 2022-05-23 DIAGNOSIS — K80.80 OTHER CHOLELITHIASIS WITHOUT OBSTRUCTION: ICD-10-CM

## 2022-05-23 DIAGNOSIS — E78.5 HYPERLIPIDEMIA, UNSPECIFIED: ICD-10-CM

## 2022-06-02 ENCOUNTER — APPOINTMENT (OUTPATIENT)
Dept: SURGERY | Facility: CLINIC | Age: 72
End: 2022-06-02
Payer: MEDICARE

## 2022-06-02 VITALS
HEIGHT: 70 IN | WEIGHT: 205 LBS | SYSTOLIC BLOOD PRESSURE: 111 MMHG | DIASTOLIC BLOOD PRESSURE: 67 MMHG | HEART RATE: 68 BPM | BODY MASS INDEX: 29.35 KG/M2

## 2022-06-02 VITALS — TEMPERATURE: 97.3 F

## 2022-06-02 DIAGNOSIS — Z01.818 ENCOUNTER FOR OTHER PREPROCEDURAL EXAMINATION: ICD-10-CM

## 2022-06-02 PROCEDURE — 99203 OFFICE O/P NEW LOW 30 MIN: CPT

## 2022-06-02 NOTE — PLAN
[FreeTextEntry1] : Mr. ANDREA DIALLO was informed of significance of findings. All the options, risks and benefits were discussed at length, including the potential to go open, small potential for bleeding, CBD injury, bile leak, and other related complications. Informed consent for laparoscopic/possible open cholecystectomy and potential risks, benefits and alternatives (surgical options were discussed including non-surgical options or the option of no surgery) to the planned surgery were discussed in depth. \par All surgical options were discussed including non-surgical treatments. Patient wishes to proceed with surgery. We will plan for surgery at [facility], at the next available date, pending any required insurance pre-certification or pre-approval. Patient agrees to obtain any necessary pre-operative evaluations and testing prior to surgery.\par Patient advised to seek immediate medical attention with any acute change in symptoms or with the development of any new or worsening symptoms. Patient's questions and concerns addressed to patient's satisfaction, and patient verbalized an understanding of the information discussed.\par \par \par

## 2022-06-02 NOTE — CONSULT LETTER
[Dear  ___] : Dear  [unfilled], [Consult Letter:] : I had the pleasure of evaluating your patient, [unfilled]. [Consult Closing:] : Thank you very much for allowing me to participate in the care of this patient.  If you have any questions, please do not hesitate to contact me. [Sincerely,] : Sincerely, [FreeTextEntry3] : Erik Kimball MD\par

## 2022-06-02 NOTE — REVIEW OF SYSTEMS
[As Noted in HPI] : as noted in HPI [Arthralgias] : arthralgias [Fever] : no fever [Chills] : no chills [Feeling Poorly] : not feeling poorly [Skin Lesions] : no skin lesions [Skin Wound] : no skin wound [Anxiety] : no anxiety [Muscle Weakness] : no muscle weakness [Swollen Glands] : no swollen glands

## 2022-06-02 NOTE — PHYSICAL EXAM
[No Rash or Lesion] : No rash or lesion [Alert] : alert [Oriented to Person] : oriented to person [Oriented to Place] : oriented to place [Oriented to Time] : oriented to time [Calm] : calm [de-identified] : A/Ox3; NAD. appears comfortable [de-identified] : EOMI, sclera anicteric  [de-identified] : no cervical lymphadenopathy  [de-identified] : no use of accessory muscles, airway patent  [de-identified] : normal HR [de-identified] : abd is obese, soft, NT ND,\par recurrent ventral hernia  [de-identified] : +ROM, normal gait

## 2022-06-02 NOTE — REASON FOR VISIT
[Consultation] : a consultation visit [Family Member] : family member [FreeTextEntry1] : RUQ abdominal pain

## 2022-06-02 NOTE — DATA REVIEWED
[FreeTextEntry1] :  US ABDOMEN RT UPR QUADRANT                      \par \par PROCEDURE DATE:  05/20/2022  \par \par \par \par INTERPRETATION:  CLINICAL INFORMATION: Right upper quadrant/right flank \par pain.\par \par COMPARISON: None available.\par \par TECHNIQUE: Sonography of the right upper quadrant.\par \par FINDINGS:\par Liver: Within normal limits.\par Bile ducts: Normal caliber. Common bile duct measures 4 mm.\par Gallbladder: Cholelithiasis. No gallbladder wall thickening or \par pericholecystic fluid.\par Pancreas: Visualized portions are within normal limits.\par Right kidney: 10.8 cm. No hydronephrosis.\par Ascites: None.\par IVC: Visualized portions are within normal limits.\par \par IMPRESSION:\par Cholelithiasis, without sonographic evidence of acute cholecystitis.

## 2022-06-02 NOTE — HISTORY OF PRESENT ILLNESS
[de-identified] : ANDREA DIALLO is a 71 year old M who is referred to the office for consultation visit, he presents w the cc of having RUQ abdominal pain. Patient had recently been to the ED, 05/20/22, w acute abdominal pain. He states he's been avoiding fatty meals /greasy foods since that time.

## 2022-07-22 ENCOUNTER — OUTPATIENT (OUTPATIENT)
Dept: OUTPATIENT SERVICES | Facility: HOSPITAL | Age: 72
LOS: 1 days | End: 2022-07-22
Payer: COMMERCIAL

## 2022-07-22 VITALS
SYSTOLIC BLOOD PRESSURE: 126 MMHG | HEART RATE: 64 BPM | TEMPERATURE: 99 F | WEIGHT: 205.03 LBS | RESPIRATION RATE: 17 BRPM | HEIGHT: 70 IN | OXYGEN SATURATION: 96 % | DIASTOLIC BLOOD PRESSURE: 73 MMHG

## 2022-07-22 DIAGNOSIS — M06.9 RHEUMATOID ARTHRITIS, UNSPECIFIED: ICD-10-CM

## 2022-07-22 DIAGNOSIS — K21.9 GASTRO-ESOPHAGEAL REFLUX DISEASE WITHOUT ESOPHAGITIS: ICD-10-CM

## 2022-07-22 DIAGNOSIS — I26.99 OTHER PULMONARY EMBOLISM WITHOUT ACUTE COR PULMONALE: ICD-10-CM

## 2022-07-22 DIAGNOSIS — Z98.890 OTHER SPECIFIED POSTPROCEDURAL STATES: Chronic | ICD-10-CM

## 2022-07-22 DIAGNOSIS — Z87.438 PERSONAL HISTORY OF OTHER DISEASES OF MALE GENITAL ORGANS: ICD-10-CM

## 2022-07-22 DIAGNOSIS — K80.20 CALCULUS OF GALLBLADDER WITHOUT CHOLECYSTITIS WITHOUT OBSTRUCTION: ICD-10-CM

## 2022-07-22 DIAGNOSIS — Z01.818 ENCOUNTER FOR OTHER PREPROCEDURAL EXAMINATION: ICD-10-CM

## 2022-07-22 DIAGNOSIS — Z96.652 PRESENCE OF LEFT ARTIFICIAL KNEE JOINT: Chronic | ICD-10-CM

## 2022-07-22 DIAGNOSIS — Z96.651 PRESENCE OF RIGHT ARTIFICIAL KNEE JOINT: Chronic | ICD-10-CM

## 2022-07-22 LAB — BLD GP AB SCN SERPL QL: SIGNIFICANT CHANGE UP

## 2022-07-22 PROCEDURE — G0463: CPT

## 2022-07-22 RX ORDER — SODIUM CHLORIDE 9 MG/ML
3 INJECTION INTRAMUSCULAR; INTRAVENOUS; SUBCUTANEOUS EVERY 8 HOURS
Refills: 0 | Status: DISCONTINUED | OUTPATIENT
Start: 2022-07-26 | End: 2022-07-26

## 2022-07-22 RX ORDER — L.ACIDOPH/B.ANIMALIS/B.LONGUM 15B CELL
1 CAPSULE ORAL
Qty: 0 | Refills: 0 | DISCHARGE

## 2022-07-22 RX ORDER — SELENIOUS ACID 40 UG/ML
1 INJECTION, SOLUTION INTRAVENOUS
Qty: 0 | Refills: 0 | DISCHARGE

## 2022-07-22 RX ORDER — LORATADINE 10 MG/1
1 TABLET ORAL
Qty: 0 | Refills: 0 | DISCHARGE

## 2022-07-22 RX ORDER — CINNAMON BARK 500 MG
2 CAPSULE ORAL
Qty: 0 | Refills: 0 | DISCHARGE

## 2022-07-22 RX ORDER — PREGABALIN 225 MG/1
1 CAPSULE ORAL
Qty: 0 | Refills: 0 | DISCHARGE

## 2022-07-22 NOTE — H&P PST ADULT - NSICDXPASTSURGICALHX_GEN_ALL_CORE_FT
PAST SURGICAL HISTORY:  H/O total knee replacement, right 2018    H/O ventral hernia repair     History of bilateral carpal tunnel release     S/P knee replacement, left

## 2022-07-22 NOTE — H&P PST ADULT - PROBLEM SELECTOR PLAN 4
Pt h/o PE in 1990 was on blood thinners for one year. Monitor pt for any signs or symptoms during hospital stay. Pt to follow up with PCP

## 2022-07-22 NOTE — H&P PST ADULT - NSICDXFAMILYHX_GEN_ALL_CORE_FT
FAMILY HISTORY:  Father  Still living? No  Family history of cerebral hemorrhage, Age at diagnosis: Age Unknown    Mother  Still living? No  Family history of dementia, Age at diagnosis: Age Unknown    Grandparent  Still living? No  Family history of heart disease, Age at diagnosis: Age Unknown

## 2022-07-22 NOTE — H&P PST ADULT - ASSESSMENT
71 yr old male with PMH: GERD, arthritis hyperlipemia, BPH, PE (1990 resolved no blood thinners) PSH bilateral knee replacement carpal tunnel release and hernia repair. Pt experienced 6 weeks ago experienced right quadrant pain with no N/V had gone to ER and sonogram revealed gallstones. Pt seen by his PCP referred pt to surgeon. Pt schedule for Laparoscopic cholecystectomy possible open on 7/26/2022.

## 2022-07-22 NOTE — H&P PST ADULT - PROBLEM SELECTOR PLAN 5
Schedule for Laparoscopic cholecystectomy possible open. Pt instructed to be NPO the night before surgery and the morning of surgery to take meds with sip of  water as instructed. Pt given written instructions to follow. Provided pt with chlorhexidene 4% solution to wash three days including the morning of surgery. Pt verbalized understanding and answered all questions.    Stop Bang Score =3 Pt is intermediated risk for SARA. maintain airway safety during hospital stay.

## 2022-07-22 NOTE — H&P PST ADULT - HISTORY OF PRESENT ILLNESS
71 yr old male with PMH: GERD, arthritis hyperlipemia, BPH,  71 yr old male with PMH: GERD, arthritis hyperlipemia, BPH, PE (1990 on no blood thinners) PSH bilateral knee replacement carpal tunnel release and hernia repair. Pt experienced 6 weeks ago experienced right quadrant pain with no N/V had gone to ER and sonogram revealed gallstones. Pt seen by his PCP referred pt to surgeon. Pt schedule for Laparoscopic cholecystectomy possible open on 7/26/2022.

## 2022-07-22 NOTE — H&P PST ADULT - NSICDXPASTMEDICALHX_GEN_ALL_CORE_FT
PAST MEDICAL HISTORY:  Arthritis     Calculus of gallbladder without cholecystitis without obstruction     GERD (gastroesophageal reflux disease)     Hemorrhoids     History of BPH     HLD (hyperlipidemia)     Osteoarthritis of right knee     PE (pulmonary embolism) in 1998, off coumadin now    Seasonal allergies

## 2022-07-22 NOTE — H&P PST ADULT - FALL HARM RISK - UNIVERSAL INTERVENTIONS
Bed in lowest position, wheels locked, appropriate side rails in place/Call bell, personal items and telephone in reach/Non-slip footwear when patient is out of bed/[place] to call system/Physically safe environment - no spills, clutter or unnecessary equipment/Purposeful Proactive Rounding/Room/bathroom lighting operational, light cord in reach

## 2022-07-24 LAB — SARS-COV-2 N GENE NPH QL NAA+PROBE: NOT DETECTED

## 2022-07-25 ENCOUNTER — TRANSCRIPTION ENCOUNTER (OUTPATIENT)
Age: 72
End: 2022-07-25

## 2022-07-26 ENCOUNTER — RESULT REVIEW (OUTPATIENT)
Age: 72
End: 2022-07-26

## 2022-07-26 ENCOUNTER — APPOINTMENT (OUTPATIENT)
Dept: SURGERY | Facility: HOSPITAL | Age: 72
End: 2022-07-26

## 2022-07-26 ENCOUNTER — OUTPATIENT (OUTPATIENT)
Dept: OUTPATIENT SERVICES | Facility: HOSPITAL | Age: 72
LOS: 1 days | End: 2022-07-26
Payer: COMMERCIAL

## 2022-07-26 ENCOUNTER — TRANSCRIPTION ENCOUNTER (OUTPATIENT)
Age: 72
End: 2022-07-26

## 2022-07-26 VITALS
HEIGHT: 70 IN | TEMPERATURE: 98 F | SYSTOLIC BLOOD PRESSURE: 107 MMHG | DIASTOLIC BLOOD PRESSURE: 74 MMHG | OXYGEN SATURATION: 95 % | WEIGHT: 205.03 LBS | HEART RATE: 77 BPM | RESPIRATION RATE: 16 BRPM

## 2022-07-26 VITALS
OXYGEN SATURATION: 98 % | SYSTOLIC BLOOD PRESSURE: 102 MMHG | HEART RATE: 88 BPM | DIASTOLIC BLOOD PRESSURE: 62 MMHG | RESPIRATION RATE: 19 BRPM | TEMPERATURE: 98 F

## 2022-07-26 DIAGNOSIS — Z98.890 OTHER SPECIFIED POSTPROCEDURAL STATES: Chronic | ICD-10-CM

## 2022-07-26 DIAGNOSIS — K80.20 CALCULUS OF GALLBLADDER WITHOUT CHOLECYSTITIS WITHOUT OBSTRUCTION: ICD-10-CM

## 2022-07-26 DIAGNOSIS — Z01.818 ENCOUNTER FOR OTHER PREPROCEDURAL EXAMINATION: ICD-10-CM

## 2022-07-26 DIAGNOSIS — Z96.651 PRESENCE OF RIGHT ARTIFICIAL KNEE JOINT: Chronic | ICD-10-CM

## 2022-07-26 DIAGNOSIS — Z96.652 PRESENCE OF LEFT ARTIFICIAL KNEE JOINT: Chronic | ICD-10-CM

## 2022-07-26 LAB — BLD GP AB SCN SERPL QL: SIGNIFICANT CHANGE UP

## 2022-07-26 PROCEDURE — 86901 BLOOD TYPING SEROLOGIC RH(D): CPT

## 2022-07-26 PROCEDURE — 86900 BLOOD TYPING SEROLOGIC ABO: CPT

## 2022-07-26 PROCEDURE — 36415 COLL VENOUS BLD VENIPUNCTURE: CPT

## 2022-07-26 PROCEDURE — 47562 LAPAROSCOPIC CHOLECYSTECTOMY: CPT

## 2022-07-26 PROCEDURE — C9399: CPT

## 2022-07-26 PROCEDURE — 88304 TISSUE EXAM BY PATHOLOGIST: CPT | Mod: 26

## 2022-07-26 PROCEDURE — 86850 RBC ANTIBODY SCREEN: CPT

## 2022-07-26 PROCEDURE — 88304 TISSUE EXAM BY PATHOLOGIST: CPT

## 2022-07-26 DEVICE — CLIP APPLIER COVIDIEN ENDOCLIP 10MM LARGE: Type: IMPLANTABLE DEVICE | Site: LAPAROSCOPIC CHOLECYSTECTOMY | Status: FUNCTIONAL

## 2022-07-26 RX ORDER — HYDROMORPHONE HYDROCHLORIDE 2 MG/ML
0.5 INJECTION INTRAMUSCULAR; INTRAVENOUS; SUBCUTANEOUS
Refills: 0 | Status: DISCONTINUED | OUTPATIENT
Start: 2022-07-26 | End: 2022-07-26

## 2022-07-26 RX ORDER — CHOLECALCIFEROL (VITAMIN D3) 125 MCG
1 CAPSULE ORAL
Qty: 0 | Refills: 0 | DISCHARGE

## 2022-07-26 RX ORDER — TROSPIUM CHLORIDE 20 MG/1
1 TABLET, FILM COATED ORAL
Qty: 0 | Refills: 0 | DISCHARGE

## 2022-07-26 RX ORDER — OXYCODONE AND ACETAMINOPHEN 5; 325 MG/1; MG/1
1 TABLET ORAL ONCE
Refills: 0 | Status: DISCONTINUED | OUTPATIENT
Start: 2022-07-26 | End: 2022-08-09

## 2022-07-26 RX ORDER — FENTANYL CITRATE 50 UG/ML
25 INJECTION INTRAVENOUS
Refills: 0 | Status: DISCONTINUED | OUTPATIENT
Start: 2022-07-26 | End: 2022-07-26

## 2022-07-26 RX ORDER — ONDANSETRON 8 MG/1
4 TABLET, FILM COATED ORAL ONCE
Refills: 0 | Status: DISCONTINUED | OUTPATIENT
Start: 2022-07-26 | End: 2022-07-26

## 2022-07-26 RX ORDER — PYRIDOXINE HCL (VITAMIN B6) 100 MG
1 TABLET ORAL
Qty: 0 | Refills: 0 | DISCHARGE

## 2022-07-26 RX ORDER — TAMSULOSIN HYDROCHLORIDE 0.4 MG/1
1 CAPSULE ORAL
Qty: 0 | Refills: 0 | DISCHARGE

## 2022-07-26 RX ORDER — TAMSULOSIN HYDROCHLORIDE 0.4 MG/1
0.4 CAPSULE ORAL AT BEDTIME
Refills: 0 | Status: DISCONTINUED | OUTPATIENT
Start: 2022-07-26 | End: 2022-08-09

## 2022-07-26 RX ORDER — FLUTICASONE PROPIONATE 50 MCG
1 SPRAY, SUSPENSION NASAL
Qty: 0 | Refills: 0 | DISCHARGE

## 2022-07-26 RX ORDER — UBIDECARENONE 100 MG
1 CAPSULE ORAL
Qty: 0 | Refills: 0 | DISCHARGE

## 2022-07-26 RX ORDER — PANTOPRAZOLE SODIUM 20 MG/1
1 TABLET, DELAYED RELEASE ORAL
Qty: 0 | Refills: 0 | DISCHARGE

## 2022-07-26 RX ADMIN — HYDROMORPHONE HYDROCHLORIDE 0.5 MILLIGRAM(S): 2 INJECTION INTRAMUSCULAR; INTRAVENOUS; SUBCUTANEOUS at 16:20

## 2022-07-26 RX ADMIN — HYDROMORPHONE HYDROCHLORIDE 0.5 MILLIGRAM(S): 2 INJECTION INTRAMUSCULAR; INTRAVENOUS; SUBCUTANEOUS at 16:14

## 2022-07-26 RX ADMIN — SODIUM CHLORIDE 3 MILLILITER(S): 9 INJECTION INTRAMUSCULAR; INTRAVENOUS; SUBCUTANEOUS at 12:06

## 2022-07-26 RX ADMIN — HYDROMORPHONE HYDROCHLORIDE 0.5 MILLIGRAM(S): 2 INJECTION INTRAMUSCULAR; INTRAVENOUS; SUBCUTANEOUS at 17:15

## 2022-07-26 RX ADMIN — HYDROMORPHONE HYDROCHLORIDE 0.5 MILLIGRAM(S): 2 INJECTION INTRAMUSCULAR; INTRAVENOUS; SUBCUTANEOUS at 16:03

## 2022-07-26 NOTE — ASU PREOP CHECKLIST - ORDERS/MEDICATION ADMINISTRATION RECORD ON CHART
Chief complaint: Migraines, left hip pain, left hand numbness    History of present illness:Crystal Pastor is a 69-year-old woman I follow for migraines. She has also had left arm numbness. She returns with reports that her headaches were doing relatively well until she had an increase that she attributes to eating things such as tomatoes, dark chocolate or bananas. She had a few headaches that did not respond to Tylenol. She continues on topiramate 25 mg b.i.d. and feels that his helpful in decreasing headache severity and frequency. There have been some vestibular migraines in the past. She denies any new neurologic symptoms. The headache frequency has increased such that she feels she needs a change in medications.    She is concerned about weight gain. She is working out daily. She feels this is helping her cardiovascular fitness but is concerned about the weight gain. She has a new complaint today of left hip pain. This has occurred intermittently throughout her life. There is some radiation down into the thigh and below the knee but she denies back pain. Heat seems to help. She also takes Tylenol. However she is concerned about etiology. She feels quite certain it is in the hip. She has a history of rheumatoid arthritis. She denies any numbness or tingling. She indicates she is not able to take anti-inflammatory medications.    She also has intermittent left hand numbness. This has not been persistent. It has been evaluated in the past but not with EMG nerve conductions. I suspected transient peripheral nerve compression. She sometimes wakes up with the symptoms.    Past Medical History:   Diagnosis Date   • Abnormal mammogram 8/21/12    Repeat left mammogram in 6 months   • Breast nodule 4/1/13    L breast mammogram due 10/1/13   • Chronic appendicitis 9/24/2004   • Coronary atherosclerosis of unspecified type of vessel, native or graft 07/09/2011    99% mid LAD treated with 2.75 x 23 mm Promus drug eluting  stent   • Left arm numbness 2/3/16    Negative MRI, echo and carotid dopplers   • Migraine headache     with vestibular migraines   • Osteoarthrosis, unspecified whether generalized or localized, hand     in knees and hands   • Rheumatoid arthritis 2/3/15    Seropositive   • Unspecified asthma         Review of systems GI negative  : Negative  Cardiac: Negative    Examination  Visit Vitals  /60   Pulse 60   Wt 72.6 kg   BMI 24.33 kg/m²      Gen.: The patient appears well and in no distress except when she walks and developed some transient left calf pain  Motor examination: Normal strength throughout. Reflexes are also depressed throughout and obtained at the Achilles and patella with reinforcement. Tone and bulk normal. Straight leg raising induces some pain in the left hip and proximal thigh but no typical radiculopathy symptoms. Leg flexion and external rotation did not increase pain. Internal rotation did not increase pain.  Coordination testing including fair to nose heel to shin intact.  Gait: Normal routine gait except for brief pain in the left calf. Mild difficulty with toe walking because of pain but no weakness. Tandem maneuver normal  Sensory: No deficits Tinel's negative at the wrist    Impression and plan:  1. Episodic migraine without progression. Patient would like to continue topiramate 25 mg b.i.d. Use Tylenol for rescue therapy  Wishes to continue topiramate at current dose to so refill provided  2. Intermittent left hand pain I suspect due to minimal left median mononeuropathy at the wrist. If progress would do EMG nerve conduction but at this time no weakness or sensory deficits  3. Left hip pain. Has some features of radiculopathy but no numbness or tingling. Could be fact disease but may be hip. Patient feels pain originates in hip. Cannot use anti-inflammatories per her report. Provided gabapentin to provide some relief as she discusses with Dr. Corey to determine if this is joint  issue. She will keep me posted call with concerns  Return to clinic in one year or sooner as needed.   done

## 2022-07-26 NOTE — ASU DISCHARGE PLAN (ADULT/PEDIATRIC) - ASU DC SPECIAL INSTRUCTIONSFT
Please follow-up with your surgeon in 1 week. Drink plenty of fluids and rest as needed. Call for any fever over 101, nausea, vomiting, severe pain, no passing of gas or bowel movement.       DIET: You may resume your regular diet. Low fat diet is recommended    SURGICAL SITES : Remove outer dressing and keep white steri-strips in place allowing them to fall off on their own. You may shower 48 hours post-operatively but do not bathe or soak in the water for 1-2 weeks; pat dry. If you notice any signs of surgical site infection (ie. redness, swelling, pain, pus drainage), please seek medical care immediately.       ACTIVITY : Do not lift anything heavier than 10 pounds for 4-6 weeks and avoid strenuous activity for 4-6 weeks.       PAIN CONTROL: You may take Motrin 600mg-800mg (with food) every 6 hours or Tylenol 650mg-1000mg every 6 hours as needed for mild pain. Stagger one medication 3 hours after the other for maximum pain control. Maximum daily dose of Tylenol should not exceed 4000mg/day.  You may take your prescribed oxycodone for severe breakthrough pain not that is not relieved by Tylenol/Motrin. Do not drive or make important decisions while taking this medication and do not take more than 4 pills in 24 hours.

## 2022-07-26 NOTE — ASU PATIENT PROFILE, ADULT - ABILITY TO HEAR (WITH HEARING AID OR HEARING APPLIANCE IF NORMALLY USED):
Chief Complaint   Patient presents with     Prenatal Care     No questions or concerns. Active baby.     Medication Reconciliation: complete    Joanne Cash LPN     Adequate: hears normal conversation without difficulty

## 2022-07-26 NOTE — ASU PATIENT PROFILE, ADULT - BLOOD AVOIDANCE/RESTRICTIONS, PROFILE
[facility] GROUP NOTE     03/05/20 0930   Activity/Group Checklist   Group Community meeting   Attendance Attended   Attendance Duration (min) 16-30   Interactions Disorganized interaction   Affect/Mood Other (Comment)  (Hyperverbal, disorganized, disjointed interactions  )   Goals Achieved   (Stood, walked around for majority of group  )   Objectives/Key Points:  Living intentionally  Goal Setting  Thought for the Day  Self Check In none

## 2022-07-26 NOTE — ASU PREOP CHECKLIST - STERILIZATION AFFIRMATION
Functions, Activity limitations: Decreased functional mobility ; Decreased ADL status; Decreased ROM; Decreased strength  Treatment Diagnosis: (L) shoulder weakness/dysfunction. Prognosis: Excellent  REQUIRES PT FOLLOW UP: Yes    Activity Tolerance  Patient Tolerated treatment well  Comments: Increased reps/resistance working patient to fatigue within 11 week RTC protocol.  Patient progressing well     Plan  Continue with current plan (Progress with strengthening within tolerable ranges)    Therapy Time:  Time In: 0930  Time Out: 1018  Minutes: 48  Timed Code Treatment Minutes: 45 Minutes    Treatment Charges: Minutes Units   []  Ultrasound     []  Electrical-Stim     []  Iontophoresis     []  Traction     []  Massage       []  Eval     []  Gait     []  Vasopneumatic Device     [x]  Ther Exercise 45  3   []  Manual Therapy       []  Ther Activities       []  Aquatics     []  Neuro Re-Ed       []  Other       Total Treatment Time: 39 BRISA Camacho
n/a

## 2022-07-26 NOTE — ASU DISCHARGE PLAN (ADULT/PEDIATRIC) - BATHING
Patient's wife walked in today to return old [facility] latitude monitor. Writer told patient that we will order a return kit to be sent to the home. Writer called [facility] customer contact center, per center, they stated patient can just recycle the monitor unless there is an issue with the monitor itself.  
Shower only

## 2022-07-26 NOTE — ASU PATIENT PROFILE, ADULT - FALL HARM RISK - UNIVERSAL INTERVENTIONS
Bed in lowest position, wheels locked, appropriate side rails in place/Call bell, personal items and telephone in reach/Instruct patient to call for assistance before getting out of bed or chair/Non-slip footwear when patient is out of bed/[place] to call system/Physically safe environment - no spills, clutter or unnecessary equipment/Purposeful Proactive Rounding/Room/bathroom lighting operational, light cord in reach

## 2022-07-26 NOTE — ASU PATIENT PROFILE, ADULT - NSICDXPASTSURGICALHX_GEN_ALL_CORE_FT
Statement Selected PAST SURGICAL HISTORY:  H/O total knee replacement, right 2018    H/O ventral hernia repair     History of bilateral carpal tunnel release     S/P knee replacement, left

## 2022-07-26 NOTE — ASU DISCHARGE PLAN (ADULT/PEDIATRIC) - NS MD DC FALL RISK RISK
For information on Fall & Injury Prevention, visit: [email or web address] OR  [email or web address] OR  https://www.cdc.gov/steadi/patient.html

## 2022-07-26 NOTE — ASU DISCHARGE PLAN (ADULT/PEDIATRIC) - CARE PROVIDER_API CALL
Erik Kimball (MD)  Surgery  [address], [place] [id number]  Phone: [phone]  Fax: [phone]  Follow Up Time:

## 2022-07-27 PROBLEM — Z87.438 PERSONAL HISTORY OF OTHER DISEASES OF MALE GENITAL ORGANS: Chronic | Status: ACTIVE | Noted: 2022-07-22

## 2022-07-27 PROBLEM — K80.20 CALCULUS OF GALLBLADDER WITHOUT CHOLECYSTITIS WITHOUT OBSTRUCTION: Chronic | Status: ACTIVE | Noted: 2022-07-22

## 2022-07-29 LAB — SURGICAL PATHOLOGY STUDY: SIGNIFICANT CHANGE UP

## 2022-08-01 ENCOUNTER — APPOINTMENT (OUTPATIENT)
Dept: SURGERY | Facility: CLINIC | Age: 72
End: 2022-08-01

## 2022-08-01 PROCEDURE — 99024 POSTOP FOLLOW-UP VISIT: CPT

## 2022-08-01 NOTE — PLAN
[FreeTextEntry1] : OTC PO stool softeners as needed\par \par Post operative wound care, activity and restrictions/precautions were reinforced. \par Patient was instructed to refrain from any heavy lifting >10-15 lbs for at least 4 weeks post operatively. \par \par Patient's questions and concerns addressed.\par \par patient will follow up if needed. Warning signs, follow up, and restrictions were discussed with the patient.\par

## 2022-08-01 NOTE — ASSESSMENT
[FreeTextEntry1] : Mr. DIALLO is a 71 year y/o M S/P laparoscopic cholecystectomy, 07/26/22. Patient w incision site tenderness, no abdominal pain; c/o difficulty with BM x 2 days. \par \par Patient is doing well, with excellent post-operative recovery. All surgical incisions are healing well and as expected. There is no evidence of infection or complication, and patient is progressing as expected.\par

## 2022-08-01 NOTE — REVIEW OF SYSTEMS
[Constipation] : constipation [Fever] : no fever [Chills] : no chills [Feeling Poorly] : not feeling poorly [Shortness Of Breath] : no shortness of breath [Cough] : no cough [Abdominal Pain] : no abdominal pain [Vomiting] : no vomiting [Skin Lesions] : no skin lesions [Dizziness] : no dizziness [Anxiety] : no anxiety

## 2022-08-01 NOTE — PHYSICAL EXAM
[Alert] : alert [Oriented to Person] : oriented to person [Oriented to Place] : oriented to place [Oriented to Time] : oriented to time [Calm] : calm [de-identified] : A/Ox3; NAD. appears comfortable [de-identified] : EOMI; sclera anicteric. [de-identified] : no cervical lymphadenopathy  [de-identified] : airway patent, no use of accessory muscles [de-identified] : Abdomen soft and non tender. Wounds healing well. Port sites with no erythema or drainage. [de-identified] : has some minimal, local skin irritation /reaction to surgical prep , no itching

## 2022-08-01 NOTE — HISTORY OF PRESENT ILLNESS
[de-identified] : ANDREA DIALLO presents to the office for postoperative visit today, he is S/P laparoscopic cholecystectomy 07/26/22. Patient states he is having some incision site tenderness. No abdominal pain. He is having some difficulty with BM's and c/o constipation x 2 days. No fevers/chills.

## 2022-11-04 ENCOUNTER — APPOINTMENT (OUTPATIENT)
Dept: CARDIOLOGY | Facility: CLINIC | Age: 72
End: 2022-11-04

## 2022-11-04 VITALS
BODY MASS INDEX: 29.35 KG/M2 | DIASTOLIC BLOOD PRESSURE: 54 MMHG | SYSTOLIC BLOOD PRESSURE: 100 MMHG | WEIGHT: 205 LBS | HEART RATE: 80 BPM | OXYGEN SATURATION: 95 % | HEIGHT: 70 IN | TEMPERATURE: 97.1 F

## 2022-11-04 VITALS — SYSTOLIC BLOOD PRESSURE: 104 MMHG | DIASTOLIC BLOOD PRESSURE: 56 MMHG

## 2022-11-04 DIAGNOSIS — Z78.9 OTHER SPECIFIED HEALTH STATUS: ICD-10-CM

## 2022-11-04 DIAGNOSIS — K80.20 CALCULUS OF GALLBLADDER W/OUT CHOLECYSTITIS W/OUT OBSTRUCTION: ICD-10-CM

## 2022-11-04 DIAGNOSIS — R07.9 CHEST PAIN, UNSPECIFIED: ICD-10-CM

## 2022-11-04 DIAGNOSIS — I35.0 NONRHEUMATIC AORTIC (VALVE) STENOSIS: ICD-10-CM

## 2022-11-04 PROCEDURE — 99214 OFFICE O/P EST MOD 30 MIN: CPT

## 2022-11-04 RX ORDER — TAMSULOSIN HYDROCHLORIDE 0.4 MG/1
0.4 CAPSULE ORAL
Qty: 90 | Refills: 0 | Status: ACTIVE | COMMUNITY
Start: 2022-09-12

## 2022-11-04 RX ORDER — TURMERIC ROOT EXTRACT 500 MG
TABLET ORAL
Refills: 0 | Status: ACTIVE | COMMUNITY

## 2022-11-04 RX ORDER — MULTIVIT-MIN/FOLIC/VIT K/LYCOP 400-300MCG
1000 TABLET ORAL
Refills: 0 | Status: ACTIVE | COMMUNITY

## 2022-11-04 RX ORDER — MULTIVITAMIN
TABLET ORAL
Refills: 0 | Status: ACTIVE | COMMUNITY

## 2022-11-04 RX ORDER — TROSPIUM CHLORIDE 20 MG/1
20 TABLET, FILM COATED ORAL
Qty: 90 | Refills: 0 | Status: ACTIVE | COMMUNITY
Start: 2022-09-22

## 2022-11-04 RX ORDER — PNV NO.95/FERROUS FUM/FOLIC AC 28MG-0.8MG
TABLET ORAL
Refills: 0 | Status: ACTIVE | COMMUNITY

## 2022-11-04 RX ORDER — VINPOCETINE 100 %
POWDER (GRAM) MISCELLANEOUS
Refills: 0 | Status: ACTIVE | COMMUNITY

## 2022-11-04 RX ORDER — UBIDECARENONE 400 MG
CAPSULE ORAL
Refills: 0 | Status: ACTIVE | COMMUNITY

## 2022-11-04 RX ORDER — BACILLUS COAGULANS/INULIN 1B-250 MG
CAPSULE ORAL
Refills: 0 | Status: ACTIVE | COMMUNITY

## 2022-11-04 RX ORDER — CELECOXIB 200 MG/1
200 CAPSULE ORAL TWICE DAILY
Refills: 0 | Status: ACTIVE | COMMUNITY

## 2022-11-04 RX ORDER — CICLOPIROX OLAMINE 7.7 MG/G
0.77 CREAM TOPICAL
Refills: 0 | Status: ACTIVE | COMMUNITY

## 2022-11-04 RX ORDER — FLUTICASONE PROPIONATE 50 UG/1
50 SPRAY, METERED NASAL
Qty: 48 | Refills: 0 | Status: ACTIVE | COMMUNITY
Start: 2022-09-30

## 2022-11-04 RX ORDER — LACTOBACILLUS ACIDOPHILUS/PECT 30 MG-20MG
TABLET ORAL
Refills: 0 | Status: ACTIVE | COMMUNITY

## 2022-11-04 RX ORDER — PANTOPRAZOLE 40 MG/1
40 TABLET, DELAYED RELEASE ORAL
Qty: 90 | Refills: 0 | Status: ACTIVE | COMMUNITY
Start: 2022-09-22

## 2022-11-04 NOTE — HISTORY OF PRESENT ILLNESS
[FreeTextEntry1] : \par 73 yo \par Mild/Moderate aortic stenosis, MAC\par Cholelithiasis and chest pain\par Former tobacco\par \par 11/2022 INITIAL VISIT: post pbmc presentation for chest pain. no cad. musculoskeletal chest pain. upper normal lvedp. likely pulled a muscle. improving. \par

## 2022-11-04 NOTE — DISCUSSION/SUMMARY
[FreeTextEntry1] : \par # Mild/Moderate aortic stenosis, MAC, mixed HLD:\par - yearly echo, monitor for symptomatic progression\par - BP optimization\par - statin\par \par # Cholelithiasis and chest pain\par \par # Former tobacco:\par - AAA ultrasound screening\par \par \par Follow in 1 year with echo.  ER precautions given to patient.\par \par

## 2022-12-06 ENCOUNTER — APPOINTMENT (OUTPATIENT)
Dept: CARDIOLOGY | Facility: CLINIC | Age: 72
End: 2022-12-06

## 2023-01-19 ENCOUNTER — APPOINTMENT (OUTPATIENT)
Dept: CARDIOLOGY | Facility: CLINIC | Age: 73
End: 2023-01-19
Payer: MEDICARE

## 2023-01-19 PROCEDURE — 93979 VASCULAR STUDY: CPT

## 2023-03-10 ENCOUNTER — APPOINTMENT (OUTPATIENT)
Dept: ORTHOPEDIC SURGERY | Facility: CLINIC | Age: 73
End: 2023-03-10
Payer: MEDICARE

## 2023-03-10 VITALS — WEIGHT: 205 LBS | HEIGHT: 70 IN | BODY MASS INDEX: 29.35 KG/M2

## 2023-03-10 DIAGNOSIS — M18.12 UNILATERAL PRIMARY OSTEOARTHRITIS OF FIRST CARPOMETACARPAL JOINT, LEFT HAND: ICD-10-CM

## 2023-03-10 DIAGNOSIS — M18.11 UNILATERAL PRIMARY OSTEOARTHRITIS OF FIRST CARPOMETACARPAL JOINT, RIGHT HAND: ICD-10-CM

## 2023-03-10 PROCEDURE — J3490M: CUSTOM

## 2023-03-10 PROCEDURE — 20600 DRAIN/INJ JOINT/BURSA W/O US: CPT | Mod: 50

## 2023-03-10 PROCEDURE — 73130 X-RAY EXAM OF HAND: CPT | Mod: LT

## 2023-03-10 PROCEDURE — 99214 OFFICE O/P EST MOD 30 MIN: CPT | Mod: 25

## 2023-03-10 NOTE — PHYSICAL EXAM
[de-identified] : R hand: \par +thumb CMC swelling \par +thumb CMC tenderness \par Decreased thumb ROM \par +Basal grind test\par \par L hand: \par +thumb CMC swelling \par +thumb CMC tenderness \par Decreased thumb ROM \par +Basal grind test\par \par Xrays CMC OA severe

## 2023-03-10 NOTE — ASSESSMENT
[FreeTextEntry1] : Bilateral Thumb CMC injection was performed because of pain inflammation and stiffness\par Anesthesia: ethyl chloride sprayed topically\par Celestone: An injection of Celestone 1cc\par Lidocaine: An injection of Lidocaine 1% 1cc\par Marcaine: An injection of Marcaine 0.5% 1cc\par x2 \par \par Patient has tried OTC's including aspirin, Ibuprofen, Aleve etc or prescription NSAIDS, and/or exercises at home and/ or\par physical therapy without satisfactory response.\par After verbal consent using sterile preparation and technique. The risks, benefits, and alternatives to cortisone injection\par were explained in full to the patient. Risks outlined include but are not limited to infection, sepsis, bleeding, scarring, skin\par discoloration, temporary increase in pain, syncopal episode, failure to resolve symptoms, allergic reaction, symptom\par recurrence, and elevation of blood sugar in diabetics. Patient understood the risks. All questions were answered. After\par discussion of options, patient requested an injection. Oral informed consent was obtained and sterile prep was done of the\par injection site. Sterile technique was utilized for the procedure including the preparation of the solutions used for the\par injection. Patient tolerated the procedure well. Advised to ice the injection site this evening.\par Prep with betadine locally to site. Sterile technique used

## 2023-03-10 NOTE — HISTORY OF PRESENT ILLNESS
Physical Therapy  Facility/Department: [facility] 2  Daily Treatment Note  NAME: Pam Duncan  : 1970  MRN: [id number]    Date of Service: 8/3/2021    Discharge Recommendations:  Patient would benefit from continued therapy after discharge   PT Equipment Recommendations  Equipment Needed: Yes  Mobility Devices: Jerel Mayotte: Rolling    Assessment   Body structures, Functions, Activity limitations: Decreased functional mobility ; Decreased strength;Decreased endurance;Decreased balance;Decreased safe awareness  Assessment: Pt grossly CGA for all functional mobility this date. Pt ambulated ~500ft with RW and CGA, requiring ~4 standing rest breaks throughout for endurance recovery. Pt steady throughout ambulation with no LOB noted. Would benefit from further PT to address further functional mobility deficits and return to prior level of independence. Prognosis: Good  REQUIRES PT FOLLOW UP: Yes  Activity Tolerance  Activity Tolerance: Patient Tolerated treatment well;Patient limited by fatigue;Patient limited by endurance     Patient Diagnosis(es): The primary encounter diagnosis was Septicemia ([address] Diagnoses of Compression fracture of thoracic vertebra, initial encounter, unspecified thoracic vertebral level (HCC) and Compression fracture of lumbar vertebra, initial encounter, unspecified lumbar vertebral level ([address] were also pertinent to this visit.      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 has a past surgical history that includes Hysterectomy (2003); Brain tumor excision (1989); fracture surgery (Left, 7-3-13); fracture surgery (Right); Upper gastrointestinal endoscopy (N/A, 10/22/2020); Colonoscopy (N/A, 10/22/2020); Endoscopic ultrasonography, GI (N/A, 12/9/2020); Upper gastrointestinal endoscopy (N/A, 4/5/2021); Hand surgery; and CT ABSCESS DRAINAGE (7/28/2021). Restrictions  Restrictions/Precautions  Restrictions/Precautions: Fall Risk, Up as Tolerated, Seizure  Required Braces or Orthoses?: No  Position Activity Restriction  Other position/activity restrictions: no activity restrictions per Gloria King with NS. Maintain SpO2 >94%, O2 NC 4 Lm. up with assist  Subjective   General  Chart Reviewed: Yes  Response To Previous Treatment: Patient with no complaints from previous session. Family / Caregiver Present: No  Subjective  Subjective: Pt and RN agreeable to PT Pt supine in bed upon arrival. Very pleasant and cooperative throughout session. General Comment  Comments: Pt retired supine in bed after session  Pain Screening  Patient Currently in Pain: Denies  Vital Signs  Patient Currently in Pain: Denies       Orientation  Orientation  Overall Orientation Status: Within Normal Limits  Cognition      Objective   Bed mobility  Supine to Sit: Stand by assistance  Sit to Supine: Stand by assistance  Scooting: Stand by assistance  Transfers  Sit to Stand: Contact guard assistance  Stand to sit: Contact guard assistance  Ambulation  Ambulation?: Yes  Ambulation 1  Surface: level tile  Device: Rolling Walker  Assistance: Contact guard assistance  Quality of Gait: steady throughout, no LOB. Gait Deviations: Slow Leticia;Decreased step length  Distance: 500ft  Comments: Pt required ~4 total standing rest breaks for endurance recovery.   Stairs/Curb  Stairs?: Yes  Stairs  # Steps : 3  Stairs Height: 6\"  Rails: Right ascending  Device: No Device  Assistance: Contact guard assistance     Balance  Posture: Good  Sitting - Static: Good  Sitting - Dynamic: Good;-  Standing - Static: Fair;+  Standing - Dynamic: Fair;+  Comments: RW used while assessing standing balance  Exercises  Hip Flexion: seated marches: x10  Hip Abduction: x10  Knee Long Arc Quad: x10  Ankle Pumps: x10   Goals  Short term goals  Time Frame for Short term goals: 14 visits  Short term goal 1: Pt will be Sneha bed mobility  Short term goal 2: Pt will be S[place] transfers  Short term goal 3: Pt will be S[place] amb 200' rW or least restrictive AD  Short term goal 4: Pt will navigate 3 steps Sneha    Plan    Plan  Times per week: 5-6x/wk  Current Treatment Recommendations: Strengthening, Balance Training, Endurance Training, Functional Mobility Training, Transfer Training, Gait Training, Stair training, Home Exercise Program, Safety Education & Training, Patient/Caregiver Education & Training, Equipment Evaluation, Education, & procurement  Safety Devices  Type of devices: Call light within reach, Nurse notified, Gait belt, Patient at risk for falls, All fall risk precautions in place, Left in chair  Restraints  Initially in place: No     Therapy Time   Individual Concurrent Group Co-treatment   Time In 1110         Time Out 1155         Minutes 45         Timed Code Treatment Minutes: [facility] [Dull/Aching] : dull/aching [Ice] : ice [de-identified] : Bilateral thumb CMC OA \par Pain \par \par Last inj 6/2021 [] : no [FreeTextEntry1] : hands [FreeTextEntry9] : CBD [FreeTextEntry5] : same pain from 2021-thumb pain. [de-identified] : activity [de-identified] : Dr. Sarabia-2021 [de-identified] : [facility]

## 2023-04-26 NOTE — H&P PST ADULT - VENOUS THROMBOEMBOLISM HISTORY
(0) indicator not present Niacinamide Pregnancy And Lactation Text: These medications are considered safe during pregnancy.

## 2023-04-27 ENCOUNTER — EMERGENCY (EMERGENCY)
Facility: HOSPITAL | Age: 73
LOS: 1 days | Discharge: HOME | End: 2023-04-27
Attending: EMERGENCY MEDICINE
Payer: COMMERCIAL

## 2023-04-27 VITALS
HEART RATE: 76 BPM | OXYGEN SATURATION: 95 % | SYSTOLIC BLOOD PRESSURE: 143 MMHG | DIASTOLIC BLOOD PRESSURE: 83 MMHG | RESPIRATION RATE: 18 BRPM | TEMPERATURE: 98 F

## 2023-04-27 VITALS
TEMPERATURE: 98 F | OXYGEN SATURATION: 96 % | RESPIRATION RATE: 18 BRPM | SYSTOLIC BLOOD PRESSURE: 107 MMHG | HEART RATE: 55 BPM | DIASTOLIC BLOOD PRESSURE: 66 MMHG

## 2023-04-27 DIAGNOSIS — Z98.890 OTHER SPECIFIED POSTPROCEDURAL STATES: Chronic | ICD-10-CM

## 2023-04-27 DIAGNOSIS — Z96.652 PRESENCE OF LEFT ARTIFICIAL KNEE JOINT: Chronic | ICD-10-CM

## 2023-04-27 DIAGNOSIS — Z96.651 PRESENCE OF RIGHT ARTIFICIAL KNEE JOINT: Chronic | ICD-10-CM

## 2023-04-27 LAB
ALBUMIN SERPL ELPH-MCNC: 4.2 G/DL — SIGNIFICANT CHANGE UP (ref 3.3–5)
ALP SERPL-CCNC: 47 U/L — SIGNIFICANT CHANGE UP (ref 40–120)
ALT FLD-CCNC: 26 U/L — SIGNIFICANT CHANGE UP (ref 10–45)
ANION GAP SERPL CALC-SCNC: 8 MMOL/L — SIGNIFICANT CHANGE UP (ref 5–17)
APPEARANCE UR: CLEAR — SIGNIFICANT CHANGE UP
AST SERPL-CCNC: 27 U/L — SIGNIFICANT CHANGE UP (ref 10–40)
BACTERIA # UR AUTO: NEGATIVE — SIGNIFICANT CHANGE UP
BASE EXCESS BLDV CALC-SCNC: 3.7 MMOL/L — HIGH (ref -2–3)
BASOPHILS # BLD AUTO: 0.03 K/UL — SIGNIFICANT CHANGE UP (ref 0–0.2)
BASOPHILS NFR BLD AUTO: 0.6 % — SIGNIFICANT CHANGE UP (ref 0–2)
BILIRUB SERPL-MCNC: 0.3 MG/DL — SIGNIFICANT CHANGE UP (ref 0.2–1.2)
BILIRUB UR-MCNC: NEGATIVE — SIGNIFICANT CHANGE UP
BUN SERPL-MCNC: 16 MG/DL — SIGNIFICANT CHANGE UP (ref 7–23)
CA-I SERPL-SCNC: 1.34 MMOL/L — HIGH (ref 1.15–1.33)
CALCIUM SERPL-MCNC: 9.8 MG/DL — SIGNIFICANT CHANGE UP (ref 8.4–10.5)
CHLORIDE BLDV-SCNC: 102 MMOL/L — SIGNIFICANT CHANGE UP (ref 96–108)
CHLORIDE SERPL-SCNC: 102 MMOL/L — SIGNIFICANT CHANGE UP (ref 96–108)
CO2 BLDV-SCNC: 33 MMOL/L — HIGH (ref 22–26)
CO2 SERPL-SCNC: 26 MMOL/L — SIGNIFICANT CHANGE UP (ref 22–31)
COLOR SPEC: YELLOW — SIGNIFICANT CHANGE UP
CREAT SERPL-MCNC: 0.79 MG/DL — SIGNIFICANT CHANGE UP (ref 0.5–1.3)
DIFF PNL FLD: NEGATIVE — SIGNIFICANT CHANGE UP
EGFR: 94 ML/MIN/1.73M2 — SIGNIFICANT CHANGE UP
EOSINOPHIL # BLD AUTO: 0.06 K/UL — SIGNIFICANT CHANGE UP (ref 0–0.5)
EOSINOPHIL NFR BLD AUTO: 1.1 % — SIGNIFICANT CHANGE UP (ref 0–6)
GAS PNL BLDV: 133 MMOL/L — LOW (ref 136–145)
GAS PNL BLDV: SIGNIFICANT CHANGE UP
GAS PNL BLDV: SIGNIFICANT CHANGE UP
GLUCOSE BLDV-MCNC: 106 MG/DL — HIGH (ref 70–99)
GLUCOSE SERPL-MCNC: 113 MG/DL — HIGH (ref 70–99)
GLUCOSE UR QL: NEGATIVE — SIGNIFICANT CHANGE UP
HCO3 BLDV-SCNC: 32 MMOL/L — HIGH (ref 22–29)
HCT VFR BLD CALC: 45.9 % — SIGNIFICANT CHANGE UP (ref 39–50)
HCT VFR BLDA CALC: 47 % — SIGNIFICANT CHANGE UP (ref 39–51)
HGB BLD CALC-MCNC: 15.5 G/DL — SIGNIFICANT CHANGE UP (ref 12.6–17.4)
HGB BLD-MCNC: 14.9 G/DL — SIGNIFICANT CHANGE UP (ref 13–17)
IMM GRANULOCYTES NFR BLD AUTO: 0.2 % — SIGNIFICANT CHANGE UP (ref 0–0.9)
KETONES UR-MCNC: NEGATIVE — SIGNIFICANT CHANGE UP
LACTATE BLDV-MCNC: 0.9 MMOL/L — SIGNIFICANT CHANGE UP (ref 0.5–2)
LEUKOCYTE ESTERASE UR-ACNC: NEGATIVE — SIGNIFICANT CHANGE UP
LIDOCAIN IGE QN: 33 U/L — SIGNIFICANT CHANGE UP (ref 7–60)
LYMPHOCYTES # BLD AUTO: 1.1 K/UL — SIGNIFICANT CHANGE UP (ref 1–3.3)
LYMPHOCYTES # BLD AUTO: 20.2 % — SIGNIFICANT CHANGE UP (ref 13–44)
MCHC RBC-ENTMCNC: 30.9 PG — SIGNIFICANT CHANGE UP (ref 27–34)
MCHC RBC-ENTMCNC: 32.5 GM/DL — SIGNIFICANT CHANGE UP (ref 32–36)
MCV RBC AUTO: 95.2 FL — SIGNIFICANT CHANGE UP (ref 80–100)
MONOCYTES # BLD AUTO: 0.54 K/UL — SIGNIFICANT CHANGE UP (ref 0–0.9)
MONOCYTES NFR BLD AUTO: 9.9 % — SIGNIFICANT CHANGE UP (ref 2–14)
NEUTROPHILS # BLD AUTO: 3.71 K/UL — SIGNIFICANT CHANGE UP (ref 1.8–7.4)
NEUTROPHILS NFR BLD AUTO: 68 % — SIGNIFICANT CHANGE UP (ref 43–77)
NITRITE UR-MCNC: NEGATIVE — SIGNIFICANT CHANGE UP
NRBC # BLD: 0 /100 WBCS — SIGNIFICANT CHANGE UP (ref 0–0)
PCO2 BLDV: 60 MMHG — HIGH (ref 42–55)
PH BLDV: 7.33 — SIGNIFICANT CHANGE UP (ref 7.32–7.43)
PH UR: 7 — SIGNIFICANT CHANGE UP (ref 5–8)
PLATELET # BLD AUTO: 123 K/UL — LOW (ref 150–400)
PO2 BLDV: 38 MMHG — SIGNIFICANT CHANGE UP (ref 25–45)
POTASSIUM BLDV-SCNC: 4.5 MMOL/L — SIGNIFICANT CHANGE UP (ref 3.5–5.1)
POTASSIUM SERPL-MCNC: 4.5 MMOL/L — SIGNIFICANT CHANGE UP (ref 3.5–5.3)
POTASSIUM SERPL-SCNC: 4.5 MMOL/L — SIGNIFICANT CHANGE UP (ref 3.5–5.3)
PROT SERPL-MCNC: 6.9 G/DL — SIGNIFICANT CHANGE UP (ref 6–8.3)
PROT UR-MCNC: NEGATIVE — SIGNIFICANT CHANGE UP
RBC # BLD: 4.82 M/UL — SIGNIFICANT CHANGE UP (ref 4.2–5.8)
RBC # FLD: 14 % — SIGNIFICANT CHANGE UP (ref 10.3–14.5)
RBC CASTS # UR COMP ASSIST: 0 /HPF — SIGNIFICANT CHANGE UP (ref 0–4)
SAO2 % BLDV: 66.3 % — LOW (ref 67–88)
SODIUM SERPL-SCNC: 136 MMOL/L — SIGNIFICANT CHANGE UP (ref 135–145)
SP GR SPEC: 1.01 — LOW (ref 1.01–1.02)
UROBILINOGEN FLD QL: NEGATIVE — SIGNIFICANT CHANGE UP
WBC # BLD: 5.45 K/UL — SIGNIFICANT CHANGE UP (ref 3.8–10.5)
WBC # FLD AUTO: 5.45 K/UL — SIGNIFICANT CHANGE UP (ref 3.8–10.5)
WBC UR QL: 2 /HPF — SIGNIFICANT CHANGE UP (ref 0–5)

## 2023-04-27 PROCEDURE — 99284 EMERGENCY DEPT VISIT MOD MDM: CPT

## 2023-04-27 PROCEDURE — G1004: CPT

## 2023-04-27 PROCEDURE — 85025 COMPLETE CBC W/AUTO DIFF WBC: CPT

## 2023-04-27 PROCEDURE — 85014 HEMATOCRIT: CPT

## 2023-04-27 PROCEDURE — 85018 HEMOGLOBIN: CPT

## 2023-04-27 PROCEDURE — 83690 ASSAY OF LIPASE: CPT

## 2023-04-27 PROCEDURE — 82947 ASSAY GLUCOSE BLOOD QUANT: CPT

## 2023-04-27 PROCEDURE — 83605 ASSAY OF LACTIC ACID: CPT

## 2023-04-27 PROCEDURE — 80053 COMPREHEN METABOLIC PANEL: CPT

## 2023-04-27 PROCEDURE — 84132 ASSAY OF SERUM POTASSIUM: CPT

## 2023-04-27 PROCEDURE — 87086 URINE CULTURE/COLONY COUNT: CPT

## 2023-04-27 PROCEDURE — 99284 EMERGENCY DEPT VISIT MOD MDM: CPT | Mod: 25

## 2023-04-27 PROCEDURE — 84295 ASSAY OF SERUM SODIUM: CPT

## 2023-04-27 PROCEDURE — 81001 URINALYSIS AUTO W/SCOPE: CPT

## 2023-04-27 PROCEDURE — 96374 THER/PROPH/DIAG INJ IV PUSH: CPT | Mod: XU

## 2023-04-27 PROCEDURE — 74177 CT ABD & PELVIS W/CONTRAST: CPT | Mod: MG

## 2023-04-27 PROCEDURE — 82565 ASSAY OF CREATININE: CPT

## 2023-04-27 PROCEDURE — 82330 ASSAY OF CALCIUM: CPT

## 2023-04-27 PROCEDURE — 74177 CT ABD & PELVIS W/CONTRAST: CPT | Mod: 26,MG

## 2023-04-27 PROCEDURE — 82803 BLOOD GASES ANY COMBINATION: CPT

## 2023-04-27 PROCEDURE — 82435 ASSAY OF BLOOD CHLORIDE: CPT

## 2023-04-27 RX ORDER — FAMOTIDINE 10 MG/ML
20 INJECTION INTRAVENOUS ONCE
Refills: 0 | Status: COMPLETED | OUTPATIENT
Start: 2023-04-27 | End: 2023-04-27

## 2023-04-27 RX ADMIN — FAMOTIDINE 20 MILLIGRAM(S): 10 INJECTION INTRAVENOUS at 14:28

## 2023-04-27 RX ADMIN — Medication 30 MILLILITER(S): at 14:28

## 2023-04-27 NOTE — ED PROVIDER NOTE - OBJECTIVE STATEMENT
73 yo male pmhx urinary incontinence, pshx cholecystectomy 07/2022 presents to the ED c/o brief episode upper right abdominal "burning". Feels slight residual burning now, not as severe. Did notice symptoms slightly worsened after eating. Denies n/v/d, fever/chills, chest pain, numbness/tingling, weakness, dizziness/lightheadedness, urinary frequency, urgency, dysuria, back pain, shortness of breath. 71 yo male pmhx urinary incontinence, pshx cholecystectomy 07/2022 presents to the ED c/o brief episode upper right abdominal "burning" began this morning after eating breakfast. Feels slight residual burning now, not as severe. Did notice symptoms slightly worsened after eating. Denies n/v/d, fever/chills, chest pain, numbness/tingling, weakness, dizziness/lightheadedness, urinary frequency, urgency, dysuria, back pain, shortness of breath.

## 2023-04-27 NOTE — ED PROVIDER NOTE - PATIENT PORTAL LINK FT
You can access the FollowMyHealth Patient Portal offered by [facility] by registering at the following website: [email or web address]. By joining [employer] [facility]’s FollowMyHealth portal, you will also be able to view your health information using other applications (apps) compatible with our system.

## 2023-04-27 NOTE — ED ADULT NURSE NOTE - OBJECTIVE STATEMENT
73 yo M presents to ED A+Ox3, ambulatory with steady gait c/o abdominal pain. Patient states he woke up at 0600 with RUQ pain. States the pain is intermittent, 8/10 and is a s 73 yo M presents to ED A+Ox3, ambulatory with steady gait c/o abdominal pain. Patient states he woke up at 0600 with RUQ pain. States the pain is non-radiating, intermittent, 8/10 and is a sharp burning pain. Denies fever, chills, N/V. Breathing spontaneous and unlabored on room air. Skin warm pink and dry. States he took Tylenol earlier today without any improvement in pain. Comfort and safety measures in place.

## 2023-04-27 NOTE — ED PROVIDER NOTE - CLINICAL SUMMARY MEDICAL DECISION MAKING FREE TEXT BOX
Bernadette: 72 year old male with pshx cholecystectomy here with brief episode of burning to ruq after breakfast and has been there.  history of sandhya 7/22.  no fever, no vomiting.  no diarrhea. abdomen soft nt/nd. no rash, no focal deficits. will get labs, gi cocktail, reassess for possible imaging.

## 2023-04-27 NOTE — ED PROVIDER NOTE - PROGRESS NOTE DETAILS
Patient reassessed, still with RUQ burning, discussed with attending, given this will pursue CT abdomen/pelvis. - Dudley Ramon PA-C Labs and CT scan not acutely actionable.  Noted bladder wall thickening on CT though UA negative, culture sent.  No rash overlying area of previous burning pain to right lateral abdomen.  No chest pain or shortness of breath.  Counseled patient on all results and advised outpatient PMD follow-up.  Patient aware that possible zoster infection may start with pain first with development of rash later and will look out for this rash.  Advised on strict return precautions.  ED attending aware and cleared patient for discharge home. - Dudley Ramon PA-C

## 2023-04-27 NOTE — ED PROVIDER NOTE - NSFOLLOWUPINSTRUCTIONS_ED_ALL_ED_FT
Please follow-up with your primary care doctor in the next 2-3 days.  Please bring a copy of all your reports with you to your appointment.    Rest, stay hydrated.  Continue current home medications as previously prescribed.    Avoid spicy/fatty foods.  Recommend bland diet and then advance diet as tolerated.    Return to the Emergency Department immediately if you develop any new/worsening symptoms including but not limited to recurrent/worsening pain, chest pain, shortness of breath, vomiting, inability to eat/drink, fever or any other concerning symptoms.

## 2023-04-27 NOTE — ED PROVIDER NOTE - NS ED ATTENDING STATEMENT MOD
This was a shared visit with the DERICK. I reviewed and verified the documentation and independently performed the documented:

## 2023-04-27 NOTE — ED PROVIDER NOTE - SKIN, MLM
Skin normal color for race, warm, dry and intact. No evidence of rash. Skin normal color for race, warm, dry and intact. No evidence of rash or vesicles over area of reported pain.

## 2023-04-29 LAB
CULTURE RESULTS: NO GROWTH — SIGNIFICANT CHANGE UP
SPECIMEN SOURCE: SIGNIFICANT CHANGE UP

## 2023-05-03 NOTE — ASSESSMENT
[FreeTextEntry1] : Mr. DIALOL is a 71 year y/o M who presents with RUQ abdominal pain, symptomatic gallstones confirmed on imaging, and history and symptoms consistent with symptomatic cholelithiasis.\par  136

## 2023-12-14 ENCOUNTER — APPOINTMENT (OUTPATIENT)
Dept: PULMONOLOGY | Facility: CLINIC | Age: 73
End: 2023-12-14

## 2024-09-13 ENCOUNTER — APPOINTMENT (OUTPATIENT)
Dept: ORTHOPEDIC SURGERY | Facility: CLINIC | Age: 74
End: 2024-09-13

## 2024-11-25 ENCOUNTER — APPOINTMENT (OUTPATIENT)
Dept: ORTHOPEDIC SURGERY | Facility: CLINIC | Age: 74
End: 2024-11-25
Payer: MEDICARE

## 2024-11-25 VITALS — WEIGHT: 205 LBS | HEIGHT: 70 IN | BODY MASS INDEX: 29.35 KG/M2

## 2024-11-25 DIAGNOSIS — Z00.00 ENCOUNTER FOR GENERAL ADULT MEDICAL EXAMINATION W/OUT ABNORMAL FINDINGS: ICD-10-CM

## 2024-11-25 DIAGNOSIS — M18.12 UNILATERAL PRIMARY OSTEOARTHRITIS OF FIRST CARPOMETACARPAL JOINT, LEFT HAND: ICD-10-CM

## 2024-11-25 DIAGNOSIS — M18.11 UNILATERAL PRIMARY OSTEOARTHRITIS OF FIRST CARPOMETACARPAL JOINT, RIGHT HAND: ICD-10-CM

## 2024-11-25 PROCEDURE — 73130 X-RAY EXAM OF HAND: CPT | Mod: LT

## 2024-11-25 PROCEDURE — 20600 DRAIN/INJ JOINT/BURSA W/O US: CPT | Mod: 50

## 2024-11-25 PROCEDURE — 99213 OFFICE O/P EST LOW 20 MIN: CPT | Mod: 25

## 2024-11-25 PROCEDURE — J3490M: CUSTOM | Mod: JZ

## 2025-02-06 ENCOUNTER — APPOINTMENT (OUTPATIENT)
Dept: ORTHOPEDIC SURGERY | Facility: CLINIC | Age: 75
End: 2025-02-06

## 2025-04-01 ENCOUNTER — APPOINTMENT (OUTPATIENT)
Dept: CARDIOLOGY | Facility: CLINIC | Age: 75
End: 2025-04-01
Payer: MEDICARE

## 2025-04-01 VITALS
HEART RATE: 70 BPM | OXYGEN SATURATION: 97 % | WEIGHT: 214 LBS | DIASTOLIC BLOOD PRESSURE: 62 MMHG | SYSTOLIC BLOOD PRESSURE: 100 MMHG | BODY MASS INDEX: 30.71 KG/M2

## 2025-04-01 DIAGNOSIS — R94.31 ABNORMAL ELECTROCARDIOGRAM [ECG] [EKG]: ICD-10-CM

## 2025-04-01 DIAGNOSIS — D75.1 SECONDARY POLYCYTHEMIA: ICD-10-CM

## 2025-04-01 DIAGNOSIS — I35.0 NONRHEUMATIC AORTIC (VALVE) STENOSIS: ICD-10-CM

## 2025-04-01 DIAGNOSIS — R07.9 CHEST PAIN, UNSPECIFIED: ICD-10-CM

## 2025-04-01 PROCEDURE — 99204 OFFICE O/P NEW MOD 45 MIN: CPT

## 2025-05-13 ENCOUNTER — APPOINTMENT (OUTPATIENT)
Dept: CARDIOLOGY | Facility: CLINIC | Age: 75
End: 2025-05-13
Payer: MEDICARE

## 2025-05-13 DIAGNOSIS — I35.0 NONRHEUMATIC AORTIC (VALVE) STENOSIS: ICD-10-CM

## 2025-05-13 PROCEDURE — 93306 TTE W/DOPPLER COMPLETE: CPT

## 2025-08-01 ENCOUNTER — APPOINTMENT (OUTPATIENT)
Dept: ORTHOPEDIC SURGERY | Facility: CLINIC | Age: 75
End: 2025-08-01
Payer: MEDICARE

## 2025-08-01 VITALS — HEIGHT: 70 IN | WEIGHT: 214 LBS | BODY MASS INDEX: 30.64 KG/M2

## 2025-08-01 DIAGNOSIS — M18.11 UNILATERAL PRIMARY OSTEOARTHRITIS OF FIRST CARPOMETACARPAL JOINT, RIGHT HAND: ICD-10-CM

## 2025-08-01 DIAGNOSIS — M18.12 UNILATERAL PRIMARY OSTEOARTHRITIS OF FIRST CARPOMETACARPAL JOINT, LEFT HAND: ICD-10-CM

## 2025-08-01 PROCEDURE — 99213 OFFICE O/P EST LOW 20 MIN: CPT | Mod: 25

## 2025-08-01 PROCEDURE — 20600 DRAIN/INJ JOINT/BURSA W/O US: CPT | Mod: LT

## 2025-08-01 PROCEDURE — J3490M: CUSTOM | Mod: JZ

## (undated) DEVICE — ELCTR FOOT CONTROL L WIRE LAPAROSCOPIC

## (undated) DEVICE — DRAPE HALF SHEET 40X57"

## (undated) DEVICE — D HELP - CLEARVIEW CLEARIFY SYSTEM

## (undated) DEVICE — DRAIN JACKSON PRATT 10MM FLAT 3/4 NO TROCAR

## (undated) DEVICE — SYR LUER LOK 10CC

## (undated) DEVICE — DRSG MASTISOL

## (undated) DEVICE — SUT POLYSORB 0 30" GU-46

## (undated) DEVICE — SOL IRR POUR NS 0.9% 1500ML

## (undated) DEVICE — BLADE SURGICAL #15 CARBON

## (undated) DEVICE — WRAP COMPRESSION CALF MED

## (undated) DEVICE — GLV 7 PROTEXIS

## (undated) DEVICE — ELCTR GROUNDING PAD ADULT COVIDIEN

## (undated) DEVICE — TUBING STRYKEFLOW II SUCTION / IRRIGATOR

## (undated) DEVICE — SUT POLYSORB 2-0 30" V-20 UNDYED

## (undated) DEVICE — GLV 7 ESTEEM BLUE

## (undated) DEVICE — PRECISION CUT SCISSOR MICROLINE MINI ENDOCUT DISP

## (undated) DEVICE — DRAPE C ARM UNIVERSAL

## (undated) DEVICE — TROCAR COVIDIEN VERSAONE BLADED 11MM STD

## (undated) DEVICE — SUT POLYSORB 4-0 27" P-12 UNDYED

## (undated) DEVICE — NDL INSUFFLATION SURGINEEDLE 120MM

## (undated) DEVICE — PACK GENERAL LAPAROSCOPY

## (undated) DEVICE — TUBING STRYKER PNEUMOSURE HEATED RTP

## (undated) DEVICE — FOR-ESU VALLEYLAB T7E14830DX: Type: DURABLE MEDICAL EQUIPMENT

## (undated) DEVICE — DRSG GAUZE PETROLEUM 3X9"

## (undated) DEVICE — SPONGE ENDO PEANUT 5MM

## (undated) DEVICE — DRSG BANDAID 0.75X3"

## (undated) DEVICE — DRAPE LIGHT HANDLE COVER BLUE

## (undated) DEVICE — BLANKET WARMER UPPER ADULT

## (undated) DEVICE — NDL HYPO SAFE 25G X 1.5"

## (undated) DEVICE — TROCAR COVIDIEN VERSAONE BLADED SMOOTH 5MM

## (undated) DEVICE — DRAIN RESERVOIR FOR JACKSON PRATT 100CC CARDINAL